# Patient Record
Sex: FEMALE | Race: OTHER | NOT HISPANIC OR LATINO | URBAN - METROPOLITAN AREA
[De-identification: names, ages, dates, MRNs, and addresses within clinical notes are randomized per-mention and may not be internally consistent; named-entity substitution may affect disease eponyms.]

---

## 2017-02-13 ENCOUNTER — EMERGENCY (EMERGENCY)
Facility: HOSPITAL | Age: 32
LOS: 1 days | Discharge: HOME | End: 2017-02-13
Attending: EMERGENCY MEDICINE | Admitting: EMERGENCY MEDICINE
Payer: COMMERCIAL

## 2017-02-13 VITALS — DIASTOLIC BLOOD PRESSURE: 74 MMHG | SYSTOLIC BLOOD PRESSURE: 118 MMHG | HEART RATE: 86 BPM | RESPIRATION RATE: 16 BRPM

## 2017-02-13 VITALS
OXYGEN SATURATION: 99 % | SYSTOLIC BLOOD PRESSURE: 102 MMHG | HEART RATE: 68 BPM | DIASTOLIC BLOOD PRESSURE: 63 MMHG | RESPIRATION RATE: 16 BRPM

## 2017-02-13 DIAGNOSIS — M54.5 LOW BACK PAIN: ICD-10-CM

## 2017-02-13 PROCEDURE — 99284 EMERGENCY DEPT VISIT MOD MDM: CPT

## 2017-02-13 PROCEDURE — 72170 X-RAY EXAM OF PELVIS: CPT | Mod: 26

## 2017-02-13 PROCEDURE — 72220 X-RAY EXAM SACRUM TAILBONE: CPT | Mod: 26

## 2017-02-13 PROCEDURE — 72170 X-RAY EXAM OF PELVIS: CPT

## 2017-02-13 PROCEDURE — 72220 X-RAY EXAM SACRUM TAILBONE: CPT

## 2017-02-13 RX ORDER — IBUPROFEN 200 MG
600 TABLET ORAL ONCE
Qty: 0 | Refills: 0 | Status: COMPLETED | OUTPATIENT
Start: 2017-02-13 | End: 2017-02-13

## 2017-02-13 RX ORDER — DIAZEPAM 5 MG
5 TABLET ORAL ONCE
Qty: 0 | Refills: 0 | Status: DISCONTINUED | OUTPATIENT
Start: 2017-02-13 | End: 2017-02-13

## 2017-02-13 RX ADMIN — Medication 600 MILLIGRAM(S): at 10:20

## 2017-02-13 RX ADMIN — Medication 5 MILLIGRAM(S): at 09:48

## 2017-02-13 RX ADMIN — Medication 600 MILLIGRAM(S): at 09:48

## 2017-02-13 NOTE — ED PROVIDER NOTE - MEDICAL DECISION MAKING DETAILS
salima -s/p mechanical fall w lower back pain, neuro intact - ttp midline sacrum -- coocyx xray - pelvic xray r/o ramus fx - analgesia and muscle relaxant and reeval

## 2017-02-13 NOTE — ED PROVIDER NOTE - PHYSICAL EXAMINATION
aao3 nad gcs 15 neck cleared cliniacvlly ctabl abd soft nt, no midline lttp ttp lower sacrum/coccyx pelvis stable from hip bl nocvat no saddle anesthesia 5/5 uele bl no shortening or rotation bl le -

## 2017-02-13 NOTE — ED PROVIDER NOTE - OBJECTIVE STATEMENT
31 f with asthma hashimotos presents with slip and fall backwards on ice no loc - co lower back pain, no weakness and numbness - pain 7/10 described as throbbing - able to ambulate with assistance - lmp 3 weeks ago no bowel or bladder complaints - ho bruised /broken taibone feels similar -

## 2017-02-13 NOTE — ED ADULT NURSE NOTE - OBJECTIVE STATEMENT
0935 31 yr old female brought to Er via ambulance on stretcher for further eval and tx of back pain. s/p slipped and fell on the ice landing on back. No LOC. MAEx4. no numbness or neck pain. A&Ox3

## 2017-02-13 NOTE — ED PROVIDER NOTE - CARE PLAN
Principal Discharge DX:	Contusion of coccyx, initial encounter Principal Discharge DX:	Contusion of coccyx, initial encounter  Instructions for follow-up, activity and diet:	1. Follow with your PMD within 48-72 hours.    2. Rest, no heavy lifting.  Warm compresses to area. Recommend Ortho consult to discuss possible MRI vs Physical Therapy- referral list provided.  Light walking. Take Motrin 600 mg every 8 hours for pain with food, .   3. Any worsening pain, weakness, numbness, bowel or urinary incontinence return to ER

## 2017-02-13 NOTE — ED PROVIDER NOTE - PLAN OF CARE
1. Follow with your PMD within 48-72 hours.    2. Rest, no heavy lifting.  Warm compresses to area. Recommend Ortho consult to discuss possible MRI vs Physical Therapy- referral list provided.  Light walking. Take Motrin 600 mg every 8 hours for pain with food, .   3. Any worsening pain, weakness, numbness, bowel or urinary incontinence return to ER

## 2017-12-08 ENCOUNTER — APPOINTMENT (OUTPATIENT)
Dept: BREAST CENTER | Facility: CLINIC | Age: 32
End: 2017-12-08
Payer: COMMERCIAL

## 2017-12-08 VITALS
RESPIRATION RATE: 14 BRPM | BODY MASS INDEX: 25.92 KG/M2 | TEMPERATURE: 97.8 F | HEIGHT: 69 IN | HEART RATE: 76 BPM | SYSTOLIC BLOOD PRESSURE: 96 MMHG | DIASTOLIC BLOOD PRESSURE: 63 MMHG | WEIGHT: 175 LBS

## 2017-12-08 PROCEDURE — 99213 OFFICE O/P EST LOW 20 MIN: CPT

## 2018-03-04 ENCOUNTER — FORM ENCOUNTER (OUTPATIENT)
Age: 33
End: 2018-03-04

## 2018-03-05 ENCOUNTER — APPOINTMENT (OUTPATIENT)
Dept: MAMMOGRAPHY | Facility: HOSPITAL | Age: 33
End: 2018-03-05

## 2018-03-05 ENCOUNTER — OUTPATIENT (OUTPATIENT)
Dept: OUTPATIENT SERVICES | Facility: HOSPITAL | Age: 33
LOS: 1 days | End: 2018-03-05
Payer: COMMERCIAL

## 2018-03-05 PROCEDURE — G0279: CPT | Mod: 26

## 2018-03-05 PROCEDURE — 77066 DX MAMMO INCL CAD BI: CPT | Mod: 26

## 2018-03-05 PROCEDURE — 77066 DX MAMMO INCL CAD BI: CPT

## 2018-03-05 PROCEDURE — G0279: CPT

## 2018-04-25 ENCOUNTER — APPOINTMENT (OUTPATIENT)
Dept: BREAST CENTER | Facility: CLINIC | Age: 33
End: 2018-04-25
Payer: COMMERCIAL

## 2018-04-25 ENCOUNTER — OUTPATIENT (OUTPATIENT)
Dept: OUTPATIENT SERVICES | Facility: HOSPITAL | Age: 33
LOS: 1 days | End: 2018-04-25

## 2018-04-25 ENCOUNTER — APPOINTMENT (OUTPATIENT)
Dept: MRI IMAGING | Facility: HOSPITAL | Age: 33
End: 2018-04-25

## 2018-04-25 DIAGNOSIS — D05.00 LOBULAR CARCINOMA IN SITU OF UNSPECIFIED BREAST: ICD-10-CM

## 2018-04-25 DIAGNOSIS — N64.52 NIPPLE DISCHARGE: ICD-10-CM

## 2018-04-25 DIAGNOSIS — D48.60 NEOPLASM OF UNCERTAIN BEHAVIOR OF UNSPECIFIED BREAST: ICD-10-CM

## 2018-04-25 DIAGNOSIS — N60.99 UNSPECIFIED BENIGN MAMMARY DYSPLASIA OF UNSPECIFIED BREAST: ICD-10-CM

## 2018-04-25 DIAGNOSIS — N63.20 UNSPECIFIED LUMP IN THE LEFT BREAST, UNSPECIFIED QUADRANT: ICD-10-CM

## 2018-04-25 PROCEDURE — 99214 OFFICE O/P EST MOD 30 MIN: CPT

## 2018-06-29 ENCOUNTER — CHART COPY (OUTPATIENT)
Age: 33
End: 2018-06-29

## 2018-07-02 ENCOUNTER — APPOINTMENT (OUTPATIENT)
Dept: MAMMOGRAPHY | Facility: HOSPITAL | Age: 33
End: 2018-07-02

## 2018-07-05 DIAGNOSIS — Z01.818 ENCOUNTER FOR OTHER PREPROCEDURAL EXAMINATION: ICD-10-CM

## 2018-07-09 ENCOUNTER — APPOINTMENT (OUTPATIENT)
Dept: ULTRASOUND IMAGING | Facility: HOSPITAL | Age: 33
End: 2018-07-09

## 2018-07-09 ENCOUNTER — APPOINTMENT (OUTPATIENT)
Dept: MAMMOGRAPHY | Facility: HOSPITAL | Age: 33
End: 2018-07-09

## 2018-07-09 ENCOUNTER — OUTPATIENT (OUTPATIENT)
Dept: OUTPATIENT SERVICES | Facility: HOSPITAL | Age: 33
LOS: 1 days | End: 2018-07-09
Payer: COMMERCIAL

## 2018-07-09 DIAGNOSIS — Z01.818 ENCOUNTER FOR OTHER PREPROCEDURAL EXAMINATION: ICD-10-CM

## 2018-07-09 LAB
ALBUMIN SERPL ELPH-MCNC: 4.1 G/DL — SIGNIFICANT CHANGE UP (ref 3.3–5)
ALP SERPL-CCNC: 36 U/L — LOW (ref 40–120)
ALT FLD-CCNC: 19 U/L — SIGNIFICANT CHANGE UP (ref 10–45)
ANION GAP SERPL CALC-SCNC: 12 MMOL/L — SIGNIFICANT CHANGE UP (ref 5–17)
AST SERPL-CCNC: 17 U/L — SIGNIFICANT CHANGE UP (ref 10–40)
BASOPHILS NFR BLD AUTO: 0.1 % — SIGNIFICANT CHANGE UP (ref 0–2)
BILIRUB SERPL-MCNC: 0.3 MG/DL — SIGNIFICANT CHANGE UP (ref 0.2–1.2)
BUN SERPL-MCNC: 4 MG/DL — LOW (ref 7–23)
CALCIUM SERPL-MCNC: 9.3 MG/DL — SIGNIFICANT CHANGE UP (ref 8.4–10.5)
CHLORIDE SERPL-SCNC: 102 MMOL/L — SIGNIFICANT CHANGE UP (ref 96–108)
CO2 SERPL-SCNC: 24 MMOL/L — SIGNIFICANT CHANGE UP (ref 22–31)
CREAT SERPL-MCNC: 0.43 MG/DL — LOW (ref 0.5–1.3)
EOSINOPHIL NFR BLD AUTO: 2.2 % — SIGNIFICANT CHANGE UP (ref 0–6)
GLUCOSE SERPL-MCNC: 98 MG/DL — SIGNIFICANT CHANGE UP (ref 70–99)
HCT VFR BLD CALC: 38 % — SIGNIFICANT CHANGE UP (ref 34.5–45)
HGB BLD-MCNC: 12.7 G/DL — SIGNIFICANT CHANGE UP (ref 11.5–15.5)
INR BLD: 1.09 — SIGNIFICANT CHANGE UP (ref 0.88–1.16)
LYMPHOCYTES # BLD AUTO: 18.6 % — SIGNIFICANT CHANGE UP (ref 13–44)
MCHC RBC-ENTMCNC: 30.7 PG — SIGNIFICANT CHANGE UP (ref 27–34)
MCHC RBC-ENTMCNC: 33.4 G/DL — SIGNIFICANT CHANGE UP (ref 32–36)
MCV RBC AUTO: 91.8 FL — SIGNIFICANT CHANGE UP (ref 80–100)
MONOCYTES NFR BLD AUTO: 4.7 % — SIGNIFICANT CHANGE UP (ref 2–14)
NEUTROPHILS NFR BLD AUTO: 74.4 % — SIGNIFICANT CHANGE UP (ref 43–77)
PLATELET # BLD AUTO: 209 K/UL — SIGNIFICANT CHANGE UP (ref 150–400)
POTASSIUM SERPL-MCNC: 4 MMOL/L — SIGNIFICANT CHANGE UP (ref 3.5–5.3)
POTASSIUM SERPL-SCNC: 4 MMOL/L — SIGNIFICANT CHANGE UP (ref 3.5–5.3)
PROT SERPL-MCNC: 6.3 G/DL — SIGNIFICANT CHANGE UP (ref 6–8.3)
PROTHROM AB SERPL-ACNC: 12.1 SEC — SIGNIFICANT CHANGE UP (ref 9.8–12.7)
RBC # BLD: 4.14 M/UL — SIGNIFICANT CHANGE UP (ref 3.8–5.2)
RBC # FLD: 12.9 % — SIGNIFICANT CHANGE UP (ref 10.3–16.9)
SODIUM SERPL-SCNC: 138 MMOL/L — SIGNIFICANT CHANGE UP (ref 135–145)
WBC # BLD: 7.9 K/UL — SIGNIFICANT CHANGE UP (ref 3.8–10.5)
WBC # FLD AUTO: 7.9 K/UL — SIGNIFICANT CHANGE UP (ref 3.8–10.5)

## 2018-07-09 PROCEDURE — 80053 COMPREHEN METABOLIC PANEL: CPT

## 2018-07-09 PROCEDURE — 93010 ELECTROCARDIOGRAM REPORT: CPT | Mod: NC

## 2018-07-09 PROCEDURE — 93005 ELECTROCARDIOGRAM TRACING: CPT

## 2018-07-09 PROCEDURE — 85610 PROTHROMBIN TIME: CPT

## 2018-07-09 PROCEDURE — 85025 COMPLETE CBC W/AUTO DIFF WBC: CPT

## 2018-07-16 ENCOUNTER — FORM ENCOUNTER (OUTPATIENT)
Age: 33
End: 2018-07-16

## 2018-07-16 PROBLEM — J45.20 MILD INTERMITTENT ASTHMA, UNCOMPLICATED: Chronic | Status: ACTIVE | Noted: 2017-02-13

## 2018-07-16 PROBLEM — E06.3 AUTOIMMUNE THYROIDITIS: Chronic | Status: ACTIVE | Noted: 2017-02-13

## 2018-07-16 NOTE — ASU PATIENT PROFILE, ADULT - PMH
Hashimoto's disease    Mild intermittent asthma without complication    Pregnancy  15 weeks pregnant

## 2018-07-17 ENCOUNTER — OUTPATIENT (OUTPATIENT)
Dept: OUTPATIENT SERVICES | Facility: HOSPITAL | Age: 33
LOS: 1 days | Discharge: HOME | End: 2018-07-17
Payer: COMMERCIAL

## 2018-07-17 ENCOUNTER — APPOINTMENT (OUTPATIENT)
Dept: MAMMOGRAPHY | Facility: HOSPITAL | Age: 33
End: 2018-07-17

## 2018-07-17 ENCOUNTER — APPOINTMENT (OUTPATIENT)
Dept: ULTRASOUND IMAGING | Facility: HOSPITAL | Age: 33
End: 2018-07-17

## 2018-07-17 ENCOUNTER — RESULT REVIEW (OUTPATIENT)
Age: 33
End: 2018-07-17

## 2018-07-17 VITALS
RESPIRATION RATE: 18 BRPM | SYSTOLIC BLOOD PRESSURE: 98 MMHG | OXYGEN SATURATION: 98 % | DIASTOLIC BLOOD PRESSURE: 58 MMHG | HEART RATE: 76 BPM

## 2018-07-17 VITALS
HEART RATE: 76 BPM | HEIGHT: 69 IN | TEMPERATURE: 98 F | OXYGEN SATURATION: 100 % | WEIGHT: 179.9 LBS | DIASTOLIC BLOOD PRESSURE: 54 MMHG | RESPIRATION RATE: 18 BRPM | SYSTOLIC BLOOD PRESSURE: 101 MMHG

## 2018-07-17 DIAGNOSIS — Z90.49 ACQUIRED ABSENCE OF OTHER SPECIFIED PARTS OF DIGESTIVE TRACT: Chronic | ICD-10-CM

## 2018-07-17 DIAGNOSIS — N60.09 SOLITARY CYST OF UNSPECIFIED BREAST: Chronic | ICD-10-CM

## 2018-07-17 LAB
GRAM STN FLD: SIGNIFICANT CHANGE UP
SPECIMEN SOURCE: SIGNIFICANT CHANGE UP

## 2018-07-17 PROCEDURE — 19120 REMOVAL OF BREAST LESION: CPT | Mod: RT

## 2018-07-17 PROCEDURE — 19126 EXCISION ADDL BREAST LESION: CPT | Mod: XS,LT

## 2018-07-17 PROCEDURE — 87015 SPECIMEN INFECT AGNT CONCNTJ: CPT

## 2018-07-17 PROCEDURE — 76098 X-RAY EXAM SURGICAL SPECIMEN: CPT

## 2018-07-17 PROCEDURE — 87070 CULTURE OTHR SPECIMN AEROBIC: CPT

## 2018-07-17 PROCEDURE — 19281 PERQ DEVICE BREAST 1ST IMAG: CPT | Mod: 59,LT

## 2018-07-17 PROCEDURE — 19282 PERQ DEVICE BREAST EA IMAG: CPT

## 2018-07-17 PROCEDURE — 88305 TISSUE EXAM BY PATHOLOGIST: CPT

## 2018-07-17 PROCEDURE — C1819: CPT

## 2018-07-17 PROCEDURE — 87102 FUNGUS ISOLATION CULTURE: CPT

## 2018-07-17 PROCEDURE — 19281 PERQ DEVICE BREAST 1ST IMAG: CPT

## 2018-07-17 PROCEDURE — 19285 PERQ DEV BREAST 1ST US IMAG: CPT | Mod: 59,LT

## 2018-07-17 PROCEDURE — 87206 SMEAR FLUORESCENT/ACID STAI: CPT

## 2018-07-17 PROCEDURE — 88307 TISSUE EXAM BY PATHOLOGIST: CPT

## 2018-07-17 PROCEDURE — 19366: CPT | Mod: RT

## 2018-07-17 PROCEDURE — 19125 EXCISION BREAST LESION: CPT | Mod: LT

## 2018-07-17 PROCEDURE — 19282 PERQ DEVICE BREAST EA IMAG: CPT | Mod: LT

## 2018-07-17 PROCEDURE — 76098 X-RAY EXAM SURGICAL SPECIMEN: CPT | Mod: 26

## 2018-07-17 PROCEDURE — 87205 SMEAR GRAM STAIN: CPT

## 2018-07-17 PROCEDURE — 87075 CULTR BACTERIA EXCEPT BLOOD: CPT

## 2018-07-17 PROCEDURE — 89051 BODY FLUID CELL COUNT: CPT

## 2018-07-17 PROCEDURE — 87116 MYCOBACTERIA CULTURE: CPT

## 2018-07-17 PROCEDURE — 19125 EXCISION BREAST LESION: CPT | Mod: GC

## 2018-07-17 PROCEDURE — 19285 PERQ DEV BREAST 1ST US IMAG: CPT

## 2018-07-17 RX ORDER — DIPHENHYDRAMINE HCL 50 MG
25 CAPSULE ORAL ONCE
Qty: 0 | Refills: 0 | Status: DISCONTINUED | OUTPATIENT
Start: 2018-07-17 | End: 2018-07-17

## 2018-07-17 RX ORDER — CEPHALEXIN 500 MG
500 CAPSULE ORAL
Qty: 10500 | Refills: 0 | OUTPATIENT
Start: 2018-07-17 | End: 2018-07-23

## 2018-07-17 RX ORDER — ACETAMINOPHEN 500 MG
650 TABLET ORAL EVERY 6 HOURS
Qty: 0 | Refills: 0 | Status: DISCONTINUED | OUTPATIENT
Start: 2018-07-17 | End: 2018-07-17

## 2018-07-17 RX ORDER — MORPHINE SULFATE 50 MG/1
1 CAPSULE, EXTENDED RELEASE ORAL
Qty: 12 | Refills: 0 | OUTPATIENT
Start: 2018-07-17 | End: 2018-07-19

## 2018-07-17 RX ORDER — DIPHENHYDRAMINE HCL 50 MG
25 CAPSULE ORAL ONCE
Qty: 0 | Refills: 0 | Status: COMPLETED | OUTPATIENT
Start: 2018-07-17 | End: 2018-07-17

## 2018-07-17 RX ADMIN — Medication 650 MILLIGRAM(S): at 17:25

## 2018-07-17 RX ADMIN — Medication 25 MILLIGRAM(S): at 17:22

## 2018-07-17 NOTE — PROGRESS NOTE ADULT - SUBJECTIVE AND OBJECTIVE BOX
Event note:    Patient examined at bedside for itching on face, upper chest, arms. Denies SOB or CP. Vitals reassuring. Also had one episode of nausea/emesis but resolved post-emesis.      Vital Signs Last 24 Hrs  T(C): 36.4 (17 Jul 2018 13:26), Max: 36.4 (17 Jul 2018 13:26)  T(F): 97.5 (17 Jul 2018 13:26), Max: 97.5 (17 Jul 2018 13:26)  HR: 72 (17 Jul 2018 16:37) (72 - 82)  BP: 105/58 (17 Jul 2018 16:37) (99/53 - 105/58)  BP(mean): 76 (17 Jul 2018 16:37) (73 - 77)  RR: 16 (17 Jul 2018 16:37) (15 - 23)  SpO2: 98% (17 Jul 2018 16:37) (97% - 100%)    I&O's Detail      Physical Exam:    No acute distress  Skin without hives. Some minor redness on R arm where patient says she was scratching.

## 2018-07-17 NOTE — PROGRESS NOTE ADULT - SUBJECTIVE AND OBJECTIVE BOX
Patient evaluated in OR postoperatively for fetal heart check.  .  Counseled patient on precautions to return to hospital, if vaginal bleeding, pelvic or abdominal pain, loss of fluid.

## 2018-07-17 NOTE — BRIEF OPERATIVE NOTE - SPECIMENS
R breast fluid sent for culture, R breast cystic mass, L sub areolar biopsy, L breast mass, L breast posterior calficiations

## 2018-07-17 NOTE — PROGRESS NOTE ADULT - ASSESSMENT
33 yo F s/p bilateral lumpectomies w/ post-op itching. Exam and vitals reassuring. Will give benadryl.

## 2018-07-17 NOTE — BRIEF OPERATIVE NOTE - OPERATION/FINDINGS
R breast w sub-areolar cystic mass w/ white-yellow fluid (pus vs mucin), L breast w/ calfcifications

## 2018-07-17 NOTE — BRIEF OPERATIVE NOTE - PROCEDURE
<<-----Click on this checkbox to enter Procedure Lumpectomy of both breasts after needle localization  07/17/2018    Active  PBORGER

## 2018-07-18 LAB
B PERT IGG+IGM PNL SER: SIGNIFICANT CHANGE UP
COLOR FLD: SIGNIFICANT CHANGE UP
FLUID INTAKE SUBSTANCE CLASS: SIGNIFICANT CHANGE UP
FLUID SEGMENTED GRANULOCYTES: 44 % — SIGNIFICANT CHANGE UP
LYMPHOCYTES # FLD: 4 % — SIGNIFICANT CHANGE UP
MONOS+MACROS # FLD: 52 % — SIGNIFICANT CHANGE UP
NIGHT BLUE STAIN TISS: SIGNIFICANT CHANGE UP
RCV VOL RI: HIGH /UL (ref 0–5)
SPECIMEN SOURCE FLD: SIGNIFICANT CHANGE UP
SPECIMEN SOURCE: SIGNIFICANT CHANGE UP
TOTAL NUCLEATED CELL COUNT, BODY FLUID: HIGH /UL (ref 0–5)
TUBE TYPE: SIGNIFICANT CHANGE UP

## 2018-07-19 LAB — CULTURE RESULTS: NO GROWTH — SIGNIFICANT CHANGE UP

## 2018-07-27 ENCOUNTER — APPOINTMENT (OUTPATIENT)
Dept: BREAST CENTER | Facility: CLINIC | Age: 33
End: 2018-07-27
Payer: COMMERCIAL

## 2018-07-27 DIAGNOSIS — N63.0 UNSPECIFIED LUMP IN UNSPECIFIED BREAST: ICD-10-CM

## 2018-07-27 DIAGNOSIS — N63.10 UNSPECIFIED LUMP IN THE RIGHT BREAST, UNSPECIFIED QUADRANT: ICD-10-CM

## 2018-07-27 LAB — SURGICAL PATHOLOGY STUDY: SIGNIFICANT CHANGE UP

## 2018-07-27 PROCEDURE — 99024 POSTOP FOLLOW-UP VISIT: CPT

## 2018-07-31 LAB — SURGICAL PATHOLOGY STUDY: SIGNIFICANT CHANGE UP

## 2018-08-15 LAB
CULTURE RESULTS: SIGNIFICANT CHANGE UP
SPECIMEN SOURCE: SIGNIFICANT CHANGE UP

## 2018-09-05 LAB
CULTURE RESULTS: SIGNIFICANT CHANGE UP
SPECIMEN SOURCE: SIGNIFICANT CHANGE UP

## 2019-01-01 ENCOUNTER — INPATIENT (INPATIENT)
Facility: HOSPITAL | Age: 34
LOS: 2 days | Discharge: HOME | End: 2019-01-04
Attending: OBSTETRICS & GYNECOLOGY | Admitting: OBSTETRICS & GYNECOLOGY
Payer: COMMERCIAL

## 2019-01-01 VITALS — HEIGHT: 69 IN | WEIGHT: 218.04 LBS

## 2019-01-01 DIAGNOSIS — O26.899 OTHER SPECIFIED PREGNANCY RELATED CONDITIONS, UNSPECIFIED TRIMESTER: ICD-10-CM

## 2019-01-01 DIAGNOSIS — Z90.49 ACQUIRED ABSENCE OF OTHER SPECIFIED PARTS OF DIGESTIVE TRACT: Chronic | ICD-10-CM

## 2019-01-01 DIAGNOSIS — Z3A.00 WEEKS OF GESTATION OF PREGNANCY NOT SPECIFIED: ICD-10-CM

## 2019-01-01 DIAGNOSIS — N60.09 SOLITARY CYST OF UNSPECIFIED BREAST: Chronic | ICD-10-CM

## 2019-01-01 LAB
BASOPHILS NFR BLD AUTO: 0.1 % — SIGNIFICANT CHANGE UP (ref 0–2)
BLD GP AB SCN SERPL QL: NEGATIVE — SIGNIFICANT CHANGE UP
EOSINOPHIL NFR BLD AUTO: 1.6 % — SIGNIFICANT CHANGE UP (ref 0–6)
HCT VFR BLD CALC: 37 % — SIGNIFICANT CHANGE UP (ref 34.5–45)
HGB BLD-MCNC: 12.7 G/DL — SIGNIFICANT CHANGE UP (ref 11.5–15.5)
LYMPHOCYTES # BLD AUTO: 22.2 % — SIGNIFICANT CHANGE UP (ref 13–44)
MCHC RBC-ENTMCNC: 32.6 PG — SIGNIFICANT CHANGE UP (ref 27–34)
MCHC RBC-ENTMCNC: 34.3 G/DL — SIGNIFICANT CHANGE UP (ref 32–36)
MCV RBC AUTO: 95.1 FL — SIGNIFICANT CHANGE UP (ref 80–100)
MONOCYTES NFR BLD AUTO: 6.9 % — SIGNIFICANT CHANGE UP (ref 2–14)
NEUTROPHILS NFR BLD AUTO: 69.2 % — SIGNIFICANT CHANGE UP (ref 43–77)
PLATELET # BLD AUTO: 196 K/UL — SIGNIFICANT CHANGE UP (ref 150–400)
RBC # BLD: 3.89 M/UL — SIGNIFICANT CHANGE UP (ref 3.8–5.2)
RBC # FLD: 13.2 % — SIGNIFICANT CHANGE UP (ref 10.3–16.9)
RH IG SCN BLD-IMP: POSITIVE — SIGNIFICANT CHANGE UP
RH IG SCN BLD-IMP: POSITIVE — SIGNIFICANT CHANGE UP
WBC # BLD: 7.5 K/UL — SIGNIFICANT CHANGE UP (ref 3.8–10.5)
WBC # FLD AUTO: 7.5 K/UL — SIGNIFICANT CHANGE UP (ref 3.8–10.5)

## 2019-01-01 RX ORDER — SODIUM CHLORIDE 9 MG/ML
1000 INJECTION, SOLUTION INTRAVENOUS ONCE
Qty: 0 | Refills: 0 | Status: DISCONTINUED | OUTPATIENT
Start: 2019-01-01 | End: 2019-01-02

## 2019-01-01 RX ORDER — CITRIC ACID/SODIUM CITRATE 300-500 MG
15 SOLUTION, ORAL ORAL EVERY 4 HOURS
Qty: 0 | Refills: 0 | Status: DISCONTINUED | OUTPATIENT
Start: 2019-01-01 | End: 2019-01-02

## 2019-01-01 RX ORDER — SODIUM CHLORIDE 9 MG/ML
1000 INJECTION, SOLUTION INTRAVENOUS
Qty: 0 | Refills: 0 | Status: DISCONTINUED | OUTPATIENT
Start: 2019-01-01 | End: 2019-01-02

## 2019-01-01 RX ORDER — OXYTOCIN 10 UNIT/ML
2 VIAL (ML) INJECTION
Qty: 30 | Refills: 0 | Status: DISCONTINUED | OUTPATIENT
Start: 2019-01-01 | End: 2019-01-02

## 2019-01-01 RX ORDER — ALBUTEROL 90 UG/1
2 AEROSOL, METERED ORAL EVERY 6 HOURS
Qty: 0 | Refills: 0 | Status: DISCONTINUED | OUTPATIENT
Start: 2019-01-01 | End: 2019-01-04

## 2019-01-01 RX ORDER — OXYTOCIN 10 UNIT/ML
333.33 VIAL (ML) INJECTION
Qty: 20 | Refills: 0 | Status: DISCONTINUED | OUTPATIENT
Start: 2019-01-01 | End: 2019-01-02

## 2019-01-01 RX ORDER — INFLUENZA VIRUS VACCINE 15; 15; 15; 15 UG/.5ML; UG/.5ML; UG/.5ML; UG/.5ML
0.5 SUSPENSION INTRAMUSCULAR ONCE
Qty: 0 | Refills: 0 | Status: DISCONTINUED | OUTPATIENT
Start: 2019-01-01 | End: 2019-01-02

## 2019-01-01 RX ORDER — FENTANYL/BUPIVACAINE/NS/PF 2MCG/ML-.1
250 PLASTIC BAG, INJECTION (ML) INJECTION
Qty: 0 | Refills: 0 | Status: DISCONTINUED | OUTPATIENT
Start: 2019-01-01 | End: 2019-01-02

## 2019-01-01 RX ADMIN — Medication 2 MILLIUNIT(S)/MIN: at 14:43

## 2019-01-01 RX ADMIN — SODIUM CHLORIDE 125 MILLILITER(S): 9 INJECTION, SOLUTION INTRAVENOUS at 14:20

## 2019-01-01 RX ADMIN — SODIUM CHLORIDE 125 MILLILITER(S): 9 INJECTION, SOLUTION INTRAVENOUS at 18:27

## 2019-01-02 LAB — T PALLIDUM AB TITR SER: NEGATIVE — SIGNIFICANT CHANGE UP

## 2019-01-02 RX ORDER — SIMETHICONE 80 MG/1
80 TABLET, CHEWABLE ORAL EVERY 6 HOURS
Qty: 0 | Refills: 0 | Status: DISCONTINUED | OUTPATIENT
Start: 2019-01-02 | End: 2019-01-04

## 2019-01-02 RX ORDER — ACETAMINOPHEN 500 MG
650 TABLET ORAL EVERY 6 HOURS
Qty: 0 | Refills: 0 | Status: DISCONTINUED | OUTPATIENT
Start: 2019-01-02 | End: 2019-01-04

## 2019-01-02 RX ORDER — TETANUS TOXOID, REDUCED DIPHTHERIA TOXOID AND ACELLULAR PERTUSSIS VACCINE, ADSORBED 5; 2.5; 8; 8; 2.5 [IU]/.5ML; [IU]/.5ML; UG/.5ML; UG/.5ML; UG/.5ML
0.5 SUSPENSION INTRAMUSCULAR ONCE
Qty: 0 | Refills: 0 | Status: COMPLETED | OUTPATIENT
Start: 2019-01-02 | End: 2019-12-01

## 2019-01-02 RX ORDER — LANOLIN
1 OINTMENT (GRAM) TOPICAL EVERY 6 HOURS
Qty: 0 | Refills: 0 | Status: DISCONTINUED | OUTPATIENT
Start: 2019-01-02 | End: 2019-01-04

## 2019-01-02 RX ORDER — LEVOTHYROXINE SODIUM 125 MCG
150 TABLET ORAL DAILY
Qty: 0 | Refills: 0 | Status: DISCONTINUED | OUTPATIENT
Start: 2019-01-02 | End: 2019-01-04

## 2019-01-02 RX ORDER — PRAMOXINE HYDROCHLORIDE 150 MG/15G
1 AEROSOL, FOAM RECTAL EVERY 4 HOURS
Qty: 0 | Refills: 0 | Status: DISCONTINUED | OUTPATIENT
Start: 2019-01-02 | End: 2019-01-04

## 2019-01-02 RX ORDER — AER TRAVELER 0.5 G/1
1 SOLUTION RECTAL; TOPICAL EVERY 4 HOURS
Qty: 0 | Refills: 0 | Status: DISCONTINUED | OUTPATIENT
Start: 2019-01-02 | End: 2019-01-04

## 2019-01-02 RX ORDER — MAGNESIUM HYDROXIDE 400 MG/1
30 TABLET, CHEWABLE ORAL
Qty: 0 | Refills: 0 | Status: DISCONTINUED | OUTPATIENT
Start: 2019-01-02 | End: 2019-01-04

## 2019-01-02 RX ORDER — SODIUM CHLORIDE 9 MG/ML
3 INJECTION INTRAMUSCULAR; INTRAVENOUS; SUBCUTANEOUS EVERY 8 HOURS
Qty: 0 | Refills: 0 | Status: DISCONTINUED | OUTPATIENT
Start: 2019-01-02 | End: 2019-01-04

## 2019-01-02 RX ORDER — DIBUCAINE 1 %
1 OINTMENT (GRAM) RECTAL EVERY 4 HOURS
Qty: 0 | Refills: 0 | Status: DISCONTINUED | OUTPATIENT
Start: 2019-01-02 | End: 2019-01-04

## 2019-01-02 RX ORDER — INFLUENZA VIRUS VACCINE 15; 15; 15; 15 UG/.5ML; UG/.5ML; UG/.5ML; UG/.5ML
0.5 SUSPENSION INTRAMUSCULAR ONCE
Qty: 0 | Refills: 0 | Status: COMPLETED | OUTPATIENT
Start: 2019-01-02 | End: 2019-01-02

## 2019-01-02 RX ORDER — DIPHENHYDRAMINE HCL 50 MG
25 CAPSULE ORAL EVERY 6 HOURS
Qty: 0 | Refills: 0 | Status: DISCONTINUED | OUTPATIENT
Start: 2019-01-02 | End: 2019-01-04

## 2019-01-02 RX ORDER — HYDROCORTISONE 1 %
1 OINTMENT (GRAM) TOPICAL EVERY 4 HOURS
Qty: 0 | Refills: 0 | Status: DISCONTINUED | OUTPATIENT
Start: 2019-01-02 | End: 2019-01-04

## 2019-01-02 RX ORDER — GLYCERIN ADULT
1 SUPPOSITORY, RECTAL RECTAL AT BEDTIME
Qty: 0 | Refills: 0 | Status: DISCONTINUED | OUTPATIENT
Start: 2019-01-02 | End: 2019-01-04

## 2019-01-02 RX ORDER — BENZOCAINE 10 %
1 GEL (GRAM) MUCOUS MEMBRANE EVERY 6 HOURS
Qty: 0 | Refills: 0 | Status: DISCONTINUED | OUTPATIENT
Start: 2019-01-02 | End: 2019-01-04

## 2019-01-02 RX ORDER — DOCUSATE SODIUM 100 MG
100 CAPSULE ORAL
Qty: 0 | Refills: 0 | Status: DISCONTINUED | OUTPATIENT
Start: 2019-01-02 | End: 2019-01-04

## 2019-01-02 RX ORDER — OXYTOCIN 10 UNIT/ML
41.67 VIAL (ML) INJECTION
Qty: 20 | Refills: 0 | Status: DISCONTINUED | OUTPATIENT
Start: 2019-01-02 | End: 2019-01-04

## 2019-01-02 RX ORDER — IBUPROFEN 200 MG
600 TABLET ORAL EVERY 6 HOURS
Qty: 0 | Refills: 0 | Status: DISCONTINUED | OUTPATIENT
Start: 2019-01-02 | End: 2019-01-04

## 2019-01-02 RX ADMIN — Medication 600 MILLIGRAM(S): at 18:38

## 2019-01-02 RX ADMIN — Medication 600 MILLIGRAM(S): at 19:33

## 2019-01-02 RX ADMIN — Medication 600 MILLIGRAM(S): at 23:46

## 2019-01-02 RX ADMIN — SIMETHICONE 80 MILLIGRAM(S): 80 TABLET, CHEWABLE ORAL at 23:46

## 2019-01-02 RX ADMIN — Medication 1 APPLICATION(S): at 12:30

## 2019-01-02 RX ADMIN — AER TRAVELER 1 APPLICATION(S): 0.5 SOLUTION RECTAL; TOPICAL at 22:10

## 2019-01-02 RX ADMIN — AER TRAVELER 1 APPLICATION(S): 0.5 SOLUTION RECTAL; TOPICAL at 12:28

## 2019-01-02 RX ADMIN — AER TRAVELER 1 APPLICATION(S): 0.5 SOLUTION RECTAL; TOPICAL at 18:38

## 2019-01-02 RX ADMIN — Medication 600 MILLIGRAM(S): at 13:41

## 2019-01-02 RX ADMIN — Medication 600 MILLIGRAM(S): at 12:27

## 2019-01-02 RX ADMIN — Medication 1 SPRAY(S): at 12:29

## 2019-01-02 RX ADMIN — Medication 100 MILLIGRAM(S): at 23:46

## 2019-01-02 RX ADMIN — Medication 100 MILLIGRAM(S): at 12:51

## 2019-01-02 RX ADMIN — Medication 1 TABLET(S): at 12:28

## 2019-01-02 NOTE — LACTATION INITIAL EVALUATION - NS LACT CON REASON FOR REQ
primaparous mom/Baby ~14 hrs old, voiding and stooling, sleeping at this time swaddled with family member. Mother reports baby fed after delivery and has either been sleepy or becomes fussy when unswaddled. Normal  behavior and breastfeeding basics explained. Assisted in placing baby in SSC and encouraged latch on. Baby suckles and fusses before falling back asleep. Mother has h/o multiple cysts removed from breasts bilaterally, with periareolar scarring on right breast. Demonstrated and encouraged hand expression to stimulate milk supply and to request pump if baby is not latching consistently by 24 hours of life. Due to h/o hashimoto’s, pt encouraged to have thyroid levels checked regularly as low thyroid can cause reduction in milk supply. Encouraged continued SSC and rooming-in, and for the parents to call for further assistance as needed.

## 2019-01-03 ENCOUNTER — TRANSCRIPTION ENCOUNTER (OUTPATIENT)
Age: 34
End: 2019-01-03

## 2019-01-03 RX ORDER — THYROID 120 MG
150 TABLET ORAL
Qty: 0 | Refills: 0 | COMMUNITY

## 2019-01-03 RX ADMIN — AER TRAVELER 1 APPLICATION(S): 0.5 SOLUTION RECTAL; TOPICAL at 10:00

## 2019-01-03 RX ADMIN — Medication 150 MICROGRAM(S): at 06:10

## 2019-01-03 RX ADMIN — Medication 600 MILLIGRAM(S): at 06:10

## 2019-01-03 RX ADMIN — AER TRAVELER 1 APPLICATION(S): 0.5 SOLUTION RECTAL; TOPICAL at 06:41

## 2019-01-03 RX ADMIN — Medication 1 TABLET(S): at 12:03

## 2019-01-03 RX ADMIN — AER TRAVELER 1 APPLICATION(S): 0.5 SOLUTION RECTAL; TOPICAL at 15:47

## 2019-01-03 RX ADMIN — Medication 600 MILLIGRAM(S): at 12:03

## 2019-01-03 RX ADMIN — SODIUM CHLORIDE 3 MILLILITER(S): 9 INJECTION INTRAMUSCULAR; INTRAVENOUS; SUBCUTANEOUS at 15:47

## 2019-01-03 RX ADMIN — Medication 600 MILLIGRAM(S): at 00:50

## 2019-01-03 RX ADMIN — AER TRAVELER 1 APPLICATION(S): 0.5 SOLUTION RECTAL; TOPICAL at 18:10

## 2019-01-03 RX ADMIN — Medication 600 MILLIGRAM(S): at 13:15

## 2019-01-03 RX ADMIN — SIMETHICONE 80 MILLIGRAM(S): 80 TABLET, CHEWABLE ORAL at 06:10

## 2019-01-03 RX ADMIN — Medication 600 MILLIGRAM(S): at 07:10

## 2019-01-03 RX ADMIN — AER TRAVELER 1 APPLICATION(S): 0.5 SOLUTION RECTAL; TOPICAL at 02:10

## 2019-01-03 NOTE — DISCHARGE NOTE OB - USE THE FOOD PYRAMID (LOCATED IN DISCHARGE MATERIALS PROVIDED) AS A GUIDE TO BALANCE AND MODERATION
normal (ped)... In no apparent distress, appears well developed and well nourished. Statement Selected

## 2019-01-03 NOTE — DISCHARGE NOTE OB - CARE PROVIDER_API CALL
Edgardo Moe (MD), Obstetrics and Gynecology  [address] [place] [address], [place] [id number]  Phone: [phone]  Fax: [phone]

## 2019-01-03 NOTE — DISCHARGE NOTE OB - MATERIALS PROVIDED
Bottle Feeding Log/[facility] Hearing Screen Program/Shaken Baby Prevention Handout/Tdap Vaccination (VIS Pub Date: 2012)/[facility]  Screening Program/[place]  Immunization Record/Breastfeeding Guide and Packet/Breastfeeding Log/Breastfeeding Mother’s Support Group Information/Guide to Postpartum Care/Back To Sleep Handout/Birth Certificate Instructions/Discharge Medication Information for Patients and Families Pocket Guide/Vaccinations

## 2019-01-03 NOTE — DISCHARGE NOTE OB - PATIENT PORTAL LINK FT
You can access the [facility] Patient Portal, offered by [facility], by registering with the following website: [email or web address]

## 2019-01-03 NOTE — PROGRESS NOTE ADULT - SUBJECTIVE AND OBJECTIVE BOX
Patient evaluated at bedside.   She reports pain is well controlled with motrin and tylenol. Patient reporting mild gas pain, encouraged taking simethacone and chewing gum    She has been ambulating without assistance, voiding spontaneously, and is breastfeeding.    She denies HA, dizziness, chest pain, palpitations, shortness of breathe, n/v, heavy vaginal bleeding or perineal discomfort.    Physical Exam:  Vital Signs Last 24 Hrs  T(C): 36.2 (03 Jan 2019 06:26), Max: 37 (02 Jan 2019 18:00)  T(F): 97.1 (03 Jan 2019 06:26), Max: 98.6 (02 Jan 2019 18:00)  HR: 80 (03 Jan 2019 06:26) (75 - 88)  BP: 97/57 (03 Jan 2019 06:26) (11/71 - 119/65)  BP(mean): --  RR: 18 (03 Jan 2019 06:26) (18 - 18)  SpO2: 95% (03 Jan 2019 06:26) (95% - 100%)    GA: NAD, A+0 x 3  Abd: + BS, soft, nontender, nondistended, no rebound or guarding, uterus firm at midline 3 fb below midline   : lochia WNL  Extremities: no swelling or calf tenderness, neg hommans sign                          12.7   7.5   )-----------( 196      ( 01 Jan 2019 15:10 )             37.0       MEDICATIONS  (STANDING):  diphtheria/tetanus/pertussis (acellular) Vaccine (ADAcel) 0.5 milliLiter(s) IntraMuscular once  ibuprofen  Tablet. 600 milliGRAM(s) Oral every 6 hours  influenza   Vaccine 0.5 milliLiter(s) IntraMuscular once  levothyroxine 150 MICROGram(s) Oral daily  oxytocin Infusion 41.667 milliUNIT(s)/Min (125 mL/Hr) IV Continuous <Continuous>  prenatal multivitamin 1 Tablet(s) Oral daily  sodium chloride 0.9% lock flush 3 milliLiter(s) IV Push every 8 hours  witch hazel Pads 1 Application(s) Topical every 4 hours    MEDICATIONS  (PRN):  acetaminophen   Tablet .. 650 milliGRAM(s) Oral every 6 hours PRN Temp greater or equal to 38.5C (101.3F), Mild Pain (1 - 3)  ALBUTerol    90 MICROgram(s) HFA Inhaler 2 Puff(s) Inhalation every 6 hours PRN Shortness of Breath and/or Wheezing  benzocaine 20%/menthol 0.5% Spray 1 Spray(s) Topical every 6 hours PRN Mild Perineal discomfort  dibucaine 1% Ointment 1 Application(s) Topical every 4 hours PRN Perineal Discomfort  diphenhydrAMINE 25 milliGRAM(s) Oral every 6 hours PRN Itching  docusate sodium 100 milliGRAM(s) Oral two times a day PRN Stool Softening  glycerin Suppository - Adult 1 Suppository(s) Rectal at bedtime PRN Constipation  hydrocortisone 1% Cream 1 Application(s) Topical every 4 hours PRN Moderate Perineal Pain  lanolin Ointment 1 Application(s) Topical every 6 hours PRN Sore Nipples  magnesium hydroxide Suspension 30 milliLiter(s) Oral two times a day PRN Constipation  pramoxine 1%/zinc 5% Cream 1 Application(s) Topical every 4 hours PRN Severe Perineal Pain  simethicone 80 milliGRAM(s) Chew every 6 hours PRN Gas

## 2019-01-04 VITALS
TEMPERATURE: 98 F | RESPIRATION RATE: 18 BRPM | OXYGEN SATURATION: 98 % | SYSTOLIC BLOOD PRESSURE: 95 MMHG | DIASTOLIC BLOOD PRESSURE: 54 MMHG | HEART RATE: 88 BPM

## 2019-01-04 PROCEDURE — 36415 COLL VENOUS BLD VENIPUNCTURE: CPT

## 2019-01-04 PROCEDURE — 99214 OFFICE O/P EST MOD 30 MIN: CPT

## 2019-01-04 PROCEDURE — 86780 TREPONEMA PALLIDUM: CPT

## 2019-01-04 PROCEDURE — 86901 BLOOD TYPING SEROLOGIC RH(D): CPT

## 2019-01-04 PROCEDURE — 86850 RBC ANTIBODY SCREEN: CPT

## 2019-01-04 PROCEDURE — 85025 COMPLETE CBC W/AUTO DIFF WBC: CPT

## 2019-01-04 PROCEDURE — 90715 TDAP VACCINE 7 YRS/> IM: CPT

## 2019-01-04 PROCEDURE — 86900 BLOOD TYPING SEROLOGIC ABO: CPT

## 2019-01-04 RX ORDER — TETANUS TOXOID, REDUCED DIPHTHERIA TOXOID AND ACELLULAR PERTUSSIS VACCINE, ADSORBED 5; 2.5; 8; 8; 2.5 [IU]/.5ML; [IU]/.5ML; UG/.5ML; UG/.5ML; UG/.5ML
0.5 SUSPENSION INTRAMUSCULAR ONCE
Qty: 0 | Refills: 0 | Status: COMPLETED | OUTPATIENT
Start: 2019-01-04 | End: 2019-01-04

## 2019-01-04 RX ADMIN — Medication 600 MILLIGRAM(S): at 13:25

## 2019-01-04 RX ADMIN — Medication 600 MILLIGRAM(S): at 00:11

## 2019-01-04 RX ADMIN — Medication 150 MICROGRAM(S): at 06:16

## 2019-01-04 RX ADMIN — Medication 600 MILLIGRAM(S): at 18:01

## 2019-01-04 RX ADMIN — Medication 25 MILLIGRAM(S): at 18:01

## 2019-01-04 RX ADMIN — Medication 100 MILLIGRAM(S): at 12:56

## 2019-01-04 RX ADMIN — TETANUS TOXOID, REDUCED DIPHTHERIA TOXOID AND ACELLULAR PERTUSSIS VACCINE, ADSORBED 0.5 MILLILITER(S): 5; 2.5; 8; 8; 2.5 SUSPENSION INTRAMUSCULAR at 12:57

## 2019-01-04 RX ADMIN — Medication 600 MILLIGRAM(S): at 06:16

## 2019-01-04 RX ADMIN — SIMETHICONE 80 MILLIGRAM(S): 80 TABLET, CHEWABLE ORAL at 06:16

## 2019-01-04 RX ADMIN — Medication 100 MILLIGRAM(S): at 00:10

## 2019-01-04 RX ADMIN — AER TRAVELER 1 APPLICATION(S): 0.5 SOLUTION RECTAL; TOPICAL at 04:16

## 2019-01-04 RX ADMIN — Medication 600 MILLIGRAM(S): at 01:00

## 2019-01-04 RX ADMIN — Medication 600 MILLIGRAM(S): at 18:26

## 2019-01-04 RX ADMIN — AER TRAVELER 1 APPLICATION(S): 0.5 SOLUTION RECTAL; TOPICAL at 20:27

## 2019-01-04 RX ADMIN — Medication 600 MILLIGRAM(S): at 12:56

## 2019-01-04 RX ADMIN — SIMETHICONE 80 MILLIGRAM(S): 80 TABLET, CHEWABLE ORAL at 00:10

## 2019-01-04 RX ADMIN — Medication 1 TABLET(S): at 12:56

## 2019-01-04 RX ADMIN — Medication 600 MILLIGRAM(S): at 07:13

## 2019-01-04 RX ADMIN — Medication 1 APPLICATION(S): at 12:56

## 2019-01-04 NOTE — PROGRESS NOTE ADULT - SUBJECTIVE AND OBJECTIVE BOX
Patient evaluated at bedside.   She reports pain is well controlled with motrin    She has been ambulating without assistance, voiding spontaneously, and is breastfeeding.    She denies HA, dizziness, chest pain, palpitations, shortness of breathe, n/v, heavy vaginal bleeding or perineal discomfort.    Physical Exam:  Vital Signs Last 24 Hrs  T(C): 36.4 (03 Jan 2019 18:00), Max: 37.1 (03 Jan 2019 10:14)  T(F): 97.6 (03 Jan 2019 18:00), Max: 98.8 (03 Jan 2019 10:14)  HR: 78 (03 Jan 2019 18:00) (78 - 93)  BP: 117/56 (03 Jan 2019 18:00) (117/56 - 117/71)  BP(mean): --  RR: 18 (03 Jan 2019 18:00) (18 - 18)  SpO2: 98% (03 Jan 2019 18:00) (98% - 98%)    GA: NAD, A+0 x 3  Abd: + BS, soft, nontender, nondistended, no rebound or guarding, uterus firm at midline 3 fb below umbilicus  : lochia WNL  Extremities: no swelling or calf tenderness        MEDICATIONS  (STANDING):  diphtheria/tetanus/pertussis (acellular) Vaccine (ADAcel) 0.5 milliLiter(s) IntraMuscular once  ibuprofen  Tablet. 600 milliGRAM(s) Oral every 6 hours  influenza   Vaccine 0.5 milliLiter(s) IntraMuscular once  levothyroxine 150 MICROGram(s) Oral daily  oxytocin Infusion 41.667 milliUNIT(s)/Min (125 mL/Hr) IV Continuous <Continuous>  prenatal multivitamin 1 Tablet(s) Oral daily  sodium chloride 0.9% lock flush 3 milliLiter(s) IV Push every 8 hours  witch hazel Pads 1 Application(s) Topical every 4 hours    MEDICATIONS  (PRN):  acetaminophen   Tablet .. 650 milliGRAM(s) Oral every 6 hours PRN Temp greater or equal to 38.5C (101.3F), Mild Pain (1 - 3)  ALBUTerol    90 MICROgram(s) HFA Inhaler 2 Puff(s) Inhalation every 6 hours PRN Shortness of Breath and/or Wheezing  benzocaine 20%/menthol 0.5% Spray 1 Spray(s) Topical every 6 hours PRN Mild Perineal discomfort  dibucaine 1% Ointment 1 Application(s) Topical every 4 hours PRN Perineal Discomfort  diphenhydrAMINE 25 milliGRAM(s) Oral every 6 hours PRN Itching  docusate sodium 100 milliGRAM(s) Oral two times a day PRN Stool Softening  glycerin Suppository - Adult 1 Suppository(s) Rectal at bedtime PRN Constipation  hydrocortisone 1% Cream 1 Application(s) Topical every 4 hours PRN Moderate Perineal Pain  lanolin Ointment 1 Application(s) Topical every 6 hours PRN Sore Nipples  magnesium hydroxide Suspension 30 milliLiter(s) Oral two times a day PRN Constipation  pramoxine 1%/zinc 5% Cream 1 Application(s) Topical every 4 hours PRN Severe Perineal Pain  simethicone 80 milliGRAM(s) Chew every 6 hours PRN Gas

## 2019-01-07 DIAGNOSIS — J45.909 UNSPECIFIED ASTHMA, UNCOMPLICATED: ICD-10-CM

## 2019-01-07 DIAGNOSIS — Z98.890 OTHER SPECIFIED POSTPROCEDURAL STATES: ICD-10-CM

## 2019-01-07 DIAGNOSIS — Z90.49 ACQUIRED ABSENCE OF OTHER SPECIFIED PARTS OF DIGESTIVE TRACT: ICD-10-CM

## 2019-01-07 DIAGNOSIS — Z34.03 ENCOUNTER FOR SUPERVISION OF NORMAL FIRST PREGNANCY, THIRD TRIMESTER: ICD-10-CM

## 2019-01-07 DIAGNOSIS — Z3A.39 39 WEEKS GESTATION OF PREGNANCY: ICD-10-CM

## 2019-01-07 DIAGNOSIS — Z88.5 ALLERGY STATUS TO NARCOTIC AGENT: ICD-10-CM

## 2019-01-07 DIAGNOSIS — Z23 ENCOUNTER FOR IMMUNIZATION: ICD-10-CM

## 2019-01-07 DIAGNOSIS — O42.92 FULL-TERM PREMATURE RUPTURE OF MEMBRANES, UNSPECIFIED AS TO LENGTH OF TIME BETWEEN RUPTURE AND ONSET OF LABOR: ICD-10-CM

## 2019-01-07 DIAGNOSIS — Z80.3 FAMILY HISTORY OF MALIGNANT NEOPLASM OF BREAST: ICD-10-CM

## 2019-01-07 DIAGNOSIS — E06.3 AUTOIMMUNE THYROIDITIS: ICD-10-CM

## 2020-03-02 ENCOUNTER — EMERGENCY (EMERGENCY)
Facility: HOSPITAL | Age: 35
LOS: 1 days | Discharge: HOME | End: 2020-03-02
Attending: EMERGENCY MEDICINE
Payer: COMMERCIAL

## 2020-03-02 VITALS
TEMPERATURE: 99 F | OXYGEN SATURATION: 99 % | SYSTOLIC BLOOD PRESSURE: 130 MMHG | DIASTOLIC BLOOD PRESSURE: 73 MMHG | RESPIRATION RATE: 18 BRPM | HEART RATE: 116 BPM

## 2020-03-02 DIAGNOSIS — N60.09 SOLITARY CYST OF UNSPECIFIED BREAST: Chronic | ICD-10-CM

## 2020-03-02 DIAGNOSIS — Z90.49 ACQUIRED ABSENCE OF OTHER SPECIFIED PARTS OF DIGESTIVE TRACT: Chronic | ICD-10-CM

## 2020-03-02 LAB
APTT BLD: 31.8 SEC — SIGNIFICANT CHANGE UP (ref 27.5–36.3)
CK SERPL-CCNC: 31 U/L — SIGNIFICANT CHANGE UP (ref 25–170)
INR BLD: 1.08 RATIO — SIGNIFICANT CHANGE UP (ref 0.88–1.16)
MAGNESIUM SERPL-MCNC: 1.9 MG/DL — SIGNIFICANT CHANGE UP (ref 1.6–2.6)
PHOSPHATE SERPL-MCNC: 4.9 MG/DL — HIGH (ref 2.5–4.5)
PROTHROM AB SERPL-ACNC: 12.5 SEC — SIGNIFICANT CHANGE UP (ref 10–12.9)
TROPONIN T, HIGH SENSITIVITY RESULT: 7 NG/L — SIGNIFICANT CHANGE UP (ref 0–51)

## 2020-03-02 PROCEDURE — 99285 EMERGENCY DEPT VISIT HI MDM: CPT

## 2020-03-02 PROCEDURE — 93010 ELECTROCARDIOGRAM REPORT: CPT

## 2020-03-02 NOTE — ED ADULT NURSE NOTE - OBJECTIVE STATEMENT
35y/o female walked into ED a&ox3 c/o racing heart. Patient reports for the past 2months she has been 35y/o female walked into ED a&ox3 c/o racing heart. Patient reports for the past 2months she has been having generalized muscle pain, racing heart and feeling hot. States symptoms come and go but have not stopped for the past 2 months. Denies HA, N/V/D, fever, SOB, chest pain, dizziness, urinary symptoms, recent travel. Lungs clear b/l. Abd soft, nontender, nondistended. YORK x4 equal in strength b/l. MD Sauer at bedside for eval.

## 2020-03-02 NOTE — ED PROVIDER NOTE - NS ED ROS FT
ROS:  GENERAL: +generalized weakness. No fever, no chills  EYES: no change in vision  HEENT: no trouble swallowing, no trouble speaking  CARDIAC: +palpitations. no chest pain  PULMONARY: +SOB. no cough   GI: no abdominal pain, no nausea, no vomiting, no diarrhea, no constipation  : No dysuria, no frequency, no change in appearance, or odor of urine  SKIN: no rashes  NEURO: no headache, no focal weakness  MSK: No joint pain    Tomasz Sauer PGY2

## 2020-03-02 NOTE — ED PROVIDER NOTE - CLINICAL SUMMARY MEDICAL DECISION MAKING FREE TEXT BOX
Generalized weakness, SOB, tachycardia, increased thirst. Pt well appearing, HD stable. Will assess for thyroid dysfunction, PE, PTX, PNA, pregnancy, electrolyte abnormality with labs, XR. Symptoms possibly 2/2 hyperthyroidism. Pt has endo f/u in 2 days.

## 2020-03-02 NOTE — ED ADULT NURSE NOTE - NSIMPLEMENTINTERV_GEN_ALL_ED
Implemented All Universal Safety Interventions:  [place] to call system. Call bell, personal items and telephone within reach. Instruct patient to call for assistance. Room bathroom lighting operational. Non-slip footwear when patient is off stretcher. Physically safe environment: no spills, clutter or unnecessary equipment. Stretcher in lowest position, wheels locked, appropriate side rails in place.

## 2020-03-02 NOTE — ED PROVIDER NOTE - PATIENT PORTAL LINK FT
You can access the FollowMyHealth Patient Portal offered by [facility] by registering at the following website: [email or web address]. By joining [employer]’s FollowMyHealth portal, you will also be able to view your health information using other applications (apps) compatible with our system.

## 2020-03-02 NOTE — ED PROVIDER NOTE - RAPID ASSESSMENT
34y F with PMHx of Hashimoto's disease, Hyperthyroidism, Asthma p/w worsening generalized weakness with increased thirst, SOB, HUTSON, muscle stiffness, hand tremors, nausea, and tachycardia x 2 months. Reports that sx started after she had gastroenteritis 2 months ago. Had similar sx in the [place]. Saw Endocrinologist last week, who told pt that she had abnormal TFTs. Denies CP, wheezing, fever, vomiting, dysuria, hematuria, or recent travel. Has 14 month child at home. LMP: last week.    **Pt seen in waiting room by Sharif PRATER), documentation completed by Cecily Higgins. Pt to be sent to main ED for further evaluation - all orders placed to be followed by MD in the main ED**

## 2020-03-02 NOTE — ED PROVIDER NOTE - OBJECTIVE STATEMENT
34F with PMH of Hashimoto's, hypothyroidism, and asthma p/w generalized weakness, increased thirst, SOB, and tachycardia x 2 months. Started shortly after an episode of gastroenteritis. Was told by endocrinologist last week that her TFTs were abnormal. Denies any chest pain, fever, vomiting, diarrhea, abd pain.

## 2020-03-02 NOTE — ED PROVIDER NOTE - ATTENDING CONTRIBUTION TO CARE
Attending MD Aguilar: I personally have seen and examined this patient.  Resident note reviewed and agree on plan of care and except where noted.  See below for details.     Seen in [place] 4    6 weeks    reports saw PMD 2 weeks after onset of symptoms, reports weakness, nausea, "always hot", unusual because always cold    Reports that she went to her PMD two weeks after symptoms started, reports PMD attributed symptoms to gastroenteritis.  Reports went to see endocrinologist at that time thyroid levels were checked and they were "high", reports typically they are "low".  Reports was told to modify her medications but reports that did not bring about any significant change in her symptoms.    Denies chest pain, shortness of breath.  Reports feels dyspnea on exertion.    Reports 3 days of bilateral lower extremity edema  Reports decided to come in today because shortness of breath worse Attending MD Aguilar: I personally have seen and examined this patient.  Resident note reviewed and agree on plan of care and except where noted.  See below for details.     Seen in [place] 4    34F with PMH/PSH including Hashimoto's, hypothyroidism, asthma presents to the ED with weakness, shortness of breath, tachycardia for at least 6 weeks.  Reports saw PMD 2 weeks after onset of symptoms, reports weakness, nausea, "always hot", unusual because always cold.  Reports PMD attributed symptoms to gastroenteritis.  Reports went to see endocrinologist at that time thyroid levels were checked and they were "high", reports typically they are "low".  Reports was told to modify her medications but reports that did not bring about any significant change in her symptoms.  Denies chest pain.  Reports feels dyspnea on exertion. Reports 3 days of bilateral lower extremity edema.  Reports decided to come in today because shortness of breath worse.  Denies fevers.  Denies nausea, vomiting, diarrhea.  On exam, NAD, head NCAT, PERRL, FROM at neck, no tenderness to midline palpation, no stepoffs along length of spine, lungs CTAB with good inspiratory effort, no wheezing, no rhonchi, no rales, no increased work of breathing, +S1S2, no m/r/g, abdomen soft with +BS, NT, ND, no CVAT, moving all extremities with 5/5 strength bilateral upper and lower extremities, good and equal  strength bilaterally, no calf tenderness, swelling, erythema or warmth; A/P: 34F with weakness, shortness of breath, tachycardia, suspect thyroid disorder, will await labs, CXR, EKG, IVFs, meds

## 2020-03-02 NOTE — ED PROVIDER NOTE - PHYSICAL EXAMINATION
Gen: AAOx3, non-toxic  Head: NCAT  HEENT: EOMI, oral mucosa moist, normal conjunctiva  Lung: CTAB, no respiratory distress, no wheezes/rhonchi/rales B/L, speaking in full sentences  CV: RRR, no murmurs, rubs or gallops  Abd: soft, NTND, no guarding, no CVA tenderness  MSK: no visible deformities  Neuro: No focal sensory or motor deficits, normal CN exam   Skin: Warm, well perfused, no rash  Psych: normal affect.     ~Tomasz Sauer PGY2

## 2020-03-02 NOTE — ED PROVIDER NOTE - NS_ ATTENDINGSCRIBEDETAILS _ED_A_ED_FT
The scribe's documentation has been prepared under my direction and personally reviewed by me in its entirety. I confirm that the note above accurately reflects all work, treatment, procedures, and medical decision making performed by me.  ARYA Rajan MD     I have performed a rapid screening assessment of this patient in the waiting room.  Additional evaluation and/or testing to be performed in the main Emergency Department by another attending. ARYA Rajan MD

## 2020-03-03 VITALS
SYSTOLIC BLOOD PRESSURE: 117 MMHG | OXYGEN SATURATION: 100 % | DIASTOLIC BLOOD PRESSURE: 50 MMHG | RESPIRATION RATE: 16 BRPM | HEART RATE: 100 BPM | TEMPERATURE: 98 F

## 2020-03-03 LAB
APPEARANCE UR: CLEAR — SIGNIFICANT CHANGE UP
BACTERIA # UR AUTO: ABNORMAL
BASOPHILS # BLD AUTO: 0.01 K/UL — SIGNIFICANT CHANGE UP (ref 0–0.2)
BASOPHILS NFR BLD AUTO: 0.3 % — SIGNIFICANT CHANGE UP (ref 0–2)
BILIRUB UR-MCNC: NEGATIVE — SIGNIFICANT CHANGE UP
COLOR SPEC: SIGNIFICANT CHANGE UP
DIFF PNL FLD: NEGATIVE — SIGNIFICANT CHANGE UP
EOSINOPHIL # BLD AUTO: 0.08 K/UL — SIGNIFICANT CHANGE UP (ref 0–0.5)
EOSINOPHIL NFR BLD AUTO: 2.2 % — SIGNIFICANT CHANGE UP (ref 0–6)
EPI CELLS # UR: 7 /HPF — HIGH
GLUCOSE UR QL: NEGATIVE — SIGNIFICANT CHANGE UP
HCG UR QL: NEGATIVE — SIGNIFICANT CHANGE UP
HCT VFR BLD CALC: 34.1 % — LOW (ref 34.5–45)
HGB BLD-MCNC: 10.8 G/DL — LOW (ref 11.5–15.5)
HYALINE CASTS # UR AUTO: 0 /LPF — SIGNIFICANT CHANGE UP (ref 0–2)
KETONES UR-MCNC: NEGATIVE — SIGNIFICANT CHANGE UP
LEUKOCYTE ESTERASE UR-ACNC: NEGATIVE — SIGNIFICANT CHANGE UP
LYMPHOCYTES # BLD AUTO: 1.98 K/UL — SIGNIFICANT CHANGE UP (ref 1–3.3)
LYMPHOCYTES # BLD AUTO: 55.3 % — HIGH (ref 13–44)
MCHC RBC-ENTMCNC: 27.5 PG — SIGNIFICANT CHANGE UP (ref 27–34)
MCHC RBC-ENTMCNC: 31.7 GM/DL — LOW (ref 32–36)
MCV RBC AUTO: 86.8 FL — SIGNIFICANT CHANGE UP (ref 80–100)
MONOCYTES # BLD AUTO: 0.5 K/UL — SIGNIFICANT CHANGE UP (ref 0–0.9)
MONOCYTES NFR BLD AUTO: 14 % — SIGNIFICANT CHANGE UP (ref 2–14)
NEUTROPHILS # BLD AUTO: 1.01 K/UL — LOW (ref 1.8–7.4)
NEUTROPHILS NFR BLD AUTO: 28.2 % — LOW (ref 43–77)
NITRITE UR-MCNC: NEGATIVE — SIGNIFICANT CHANGE UP
NRBC # BLD: 0 /100 WBCS — SIGNIFICANT CHANGE UP (ref 0–0)
PH UR: 6.5 — SIGNIFICANT CHANGE UP (ref 5–8)
PLATELET # BLD AUTO: 194 K/UL — SIGNIFICANT CHANGE UP (ref 150–400)
PROT UR-MCNC: SIGNIFICANT CHANGE UP
RBC # BLD: 3.93 M/UL — SIGNIFICANT CHANGE UP (ref 3.8–5.2)
RBC # FLD: 12.5 % — SIGNIFICANT CHANGE UP (ref 10.3–14.5)
RBC CASTS # UR COMP ASSIST: 4 /HPF — SIGNIFICANT CHANGE UP (ref 0–4)
SP GR SPEC: 1.02 — SIGNIFICANT CHANGE UP (ref 1.01–1.02)
T3 SERPL-MCNC: >650 NG/DL — HIGH (ref 80–200)
T4 AB SER-ACNC: 21.5 UG/DL — HIGH (ref 4.6–12)
TROPONIN T, HIGH SENSITIVITY RESULT: <6 NG/L — SIGNIFICANT CHANGE UP (ref 0–51)
TSH SERPL-MCNC: <0.01 UIU/ML — LOW (ref 0.27–4.2)
UROBILINOGEN FLD QL: NEGATIVE — SIGNIFICANT CHANGE UP
WBC # BLD: 3.58 K/UL — LOW (ref 3.8–10.5)
WBC # FLD AUTO: 3.58 K/UL — LOW (ref 3.8–10.5)
WBC UR QL: 1 /HPF — SIGNIFICANT CHANGE UP (ref 0–5)

## 2020-03-03 PROCEDURE — 80053 COMPREHEN METABOLIC PANEL: CPT

## 2020-03-03 PROCEDURE — 85610 PROTHROMBIN TIME: CPT

## 2020-03-03 PROCEDURE — 82550 ASSAY OF CK (CPK): CPT

## 2020-03-03 PROCEDURE — 85379 FIBRIN DEGRADATION QUANT: CPT

## 2020-03-03 PROCEDURE — 71046 X-RAY EXAM CHEST 2 VIEWS: CPT

## 2020-03-03 PROCEDURE — 84480 ASSAY TRIIODOTHYRONINE (T3): CPT

## 2020-03-03 PROCEDURE — 83735 ASSAY OF MAGNESIUM: CPT

## 2020-03-03 PROCEDURE — 84484 ASSAY OF TROPONIN QUANT: CPT

## 2020-03-03 PROCEDURE — 99283 EMERGENCY DEPT VISIT LOW MDM: CPT

## 2020-03-03 PROCEDURE — 84443 ASSAY THYROID STIM HORMONE: CPT

## 2020-03-03 PROCEDURE — 84436 ASSAY OF TOTAL THYROXINE: CPT

## 2020-03-03 PROCEDURE — 84100 ASSAY OF PHOSPHORUS: CPT

## 2020-03-03 PROCEDURE — 81001 URINALYSIS AUTO W/SCOPE: CPT

## 2020-03-03 PROCEDURE — 81025 URINE PREGNANCY TEST: CPT

## 2020-03-03 PROCEDURE — 85730 THROMBOPLASTIN TIME PARTIAL: CPT

## 2020-03-03 PROCEDURE — 93005 ELECTROCARDIOGRAM TRACING: CPT

## 2020-03-03 PROCEDURE — 71046 X-RAY EXAM CHEST 2 VIEWS: CPT | Mod: 26

## 2020-03-03 PROCEDURE — 85027 COMPLETE CBC AUTOMATED: CPT

## 2020-03-03 RX ORDER — IBUPROFEN 200 MG
600 TABLET ORAL ONCE
Refills: 0 | Status: COMPLETED | OUTPATIENT
Start: 2020-03-03 | End: 2020-03-03

## 2020-03-03 RX ORDER — SODIUM CHLORIDE 9 MG/ML
1000 INJECTION INTRAMUSCULAR; INTRAVENOUS; SUBCUTANEOUS ONCE
Refills: 0 | Status: COMPLETED | OUTPATIENT
Start: 2020-03-03 | End: 2020-03-03

## 2020-03-03 RX ADMIN — SODIUM CHLORIDE 1000 MILLILITER(S): 9 INJECTION INTRAMUSCULAR; INTRAVENOUS; SUBCUTANEOUS at 01:27

## 2020-03-04 NOTE — ED POST DISCHARGE NOTE - DETAILS
spoke with patient, went over results, she has an appointment with her endocrinologist tomorrow, at her request faxed thyroid panel results to Dr. Nicole [phone]

## 2020-03-05 ENCOUNTER — TRANSCRIPTION ENCOUNTER (OUTPATIENT)
Age: 35
End: 2020-03-05

## 2020-04-20 ENCOUNTER — APPOINTMENT (OUTPATIENT)
Dept: ANTEPARTUM | Facility: CLINIC | Age: 35
End: 2020-04-20
Payer: COMMERCIAL

## 2020-04-20 PROCEDURE — 76801 OB US < 14 WKS SINGLE FETUS: CPT

## 2020-05-15 ENCOUNTER — APPOINTMENT (OUTPATIENT)
Dept: ANTEPARTUM | Facility: CLINIC | Age: 35
End: 2020-05-15
Payer: COMMERCIAL

## 2020-05-15 PROCEDURE — 76801 OB US < 14 WKS SINGLE FETUS: CPT

## 2020-05-15 PROCEDURE — 76817 TRANSVAGINAL US OBSTETRIC: CPT

## 2020-05-20 ENCOUNTER — APPOINTMENT (OUTPATIENT)
Dept: ANTEPARTUM | Facility: CLINIC | Age: 35
End: 2020-05-20
Payer: COMMERCIAL

## 2020-05-20 ENCOUNTER — LABORATORY RESULT (OUTPATIENT)
Age: 35
End: 2020-05-20

## 2020-05-20 PROCEDURE — 76813 OB US NUCHAL MEAS 1 GEST: CPT

## 2020-06-23 ENCOUNTER — APPOINTMENT (OUTPATIENT)
Dept: ANTEPARTUM | Facility: CLINIC | Age: 35
End: 2020-06-23
Payer: COMMERCIAL

## 2020-06-23 PROCEDURE — 76811 OB US DETAILED SNGL FETUS: CPT

## 2020-06-26 ENCOUNTER — APPOINTMENT (OUTPATIENT)
Dept: PEDIATRIC CARDIOLOGY | Facility: CLINIC | Age: 35
End: 2020-06-26

## 2020-06-30 ENCOUNTER — APPOINTMENT (OUTPATIENT)
Dept: PEDIATRIC CARDIOLOGY | Facility: CLINIC | Age: 35
End: 2020-06-30
Payer: COMMERCIAL

## 2020-06-30 PROCEDURE — 93325 DOPPLER ECHO COLOR FLOW MAPG: CPT

## 2020-06-30 PROCEDURE — 76827 ECHO EXAM OF FETAL HEART: CPT

## 2020-06-30 PROCEDURE — 76825 ECHO EXAM OF FETAL HEART: CPT

## 2020-06-30 PROCEDURE — 99203 OFFICE O/P NEW LOW 30 MIN: CPT | Mod: 25

## 2020-07-01 ENCOUNTER — APPOINTMENT (OUTPATIENT)
Dept: ANTEPARTUM | Facility: CLINIC | Age: 35
End: 2020-07-01
Payer: COMMERCIAL

## 2020-07-01 ENCOUNTER — INPATIENT (INPATIENT)
Facility: HOSPITAL | Age: 35
LOS: 5 days | Discharge: HOME | End: 2020-07-07
Attending: SPECIALIST | Admitting: SPECIALIST
Payer: COMMERCIAL

## 2020-07-01 VITALS
HEIGHT: 55 IN | OXYGEN SATURATION: 100 % | SYSTOLIC BLOOD PRESSURE: 121 MMHG | HEART RATE: 80 BPM | RESPIRATION RATE: 16 BRPM | TEMPERATURE: 98 F | DIASTOLIC BLOOD PRESSURE: 43 MMHG | WEIGHT: 178.57 LBS

## 2020-07-01 DIAGNOSIS — Z04.9 ENCOUNTER FOR EXAMINATION AND OBSERVATION FOR UNSPECIFIED REASON: ICD-10-CM

## 2020-07-01 DIAGNOSIS — Z90.49 ACQUIRED ABSENCE OF OTHER SPECIFIED PARTS OF DIGESTIVE TRACT: Chronic | ICD-10-CM

## 2020-07-01 DIAGNOSIS — O99.282 ENDOCRINE, NUTRITIONAL AND METABOLIC DISEASES COMPLICATING PREGNANCY, SECOND TRIMESTER: ICD-10-CM

## 2020-07-01 DIAGNOSIS — N60.09 SOLITARY CYST OF UNSPECIFIED BREAST: Chronic | ICD-10-CM

## 2020-07-01 DIAGNOSIS — O36.8390 MATERNAL CARE FOR ABNORMALITIES OF THE FETAL HEART RATE OR RHYTHM, UNSPECIFIED TRIMESTER, NOT APPLICABLE OR UNSPECIFIED: ICD-10-CM

## 2020-07-01 PROCEDURE — 76817 TRANSVAGINAL US OBSTETRIC: CPT

## 2020-07-01 PROCEDURE — 76816 OB US FOLLOW-UP PER FETUS: CPT

## 2020-07-01 PROCEDURE — 99214 OFFICE O/P EST MOD 30 MIN: CPT

## 2020-07-01 RX ORDER — CALCIUM CARBONATE 500(1250)
1 TABLET ORAL
Refills: 0 | Status: DISCONTINUED | OUTPATIENT
Start: 2020-07-01 | End: 2020-07-07

## 2020-07-01 NOTE — OB RN PATIENT PROFILE - SPIRITUAL CULTURAL, CURRENT SITUATION, PROFILE
----- Message from Belgica Mcduffie sent at 4/12/2019  3:05 PM CDT -----  Contact: [phone] /self  Patient is requesting a call back. She needs to Dr. Abbasi only on Monday. Please call her.      none

## 2020-07-01 NOTE — OB PROVIDER H&P - HISTORY OF PRESENT ILLNESS
Patient is a 33 yo  at 18w1d LMP  with COURTNEY of 20 here with hyperthyroidism and fetal tachycardia. Patient diagnosed with hyperthyroidism in March and was started on propanolol for symptoms. She was not started on PTU and later that month discovered she was pregnant. She feels well today, complains of mild GERD. Denies fever, chills, SOB, abdominal pain, n/v, diarrhea. She was given 5mg of methimazole in the office prior to arrival at the hospital.    OBHx: 2019  FT 7lb c/b hypothyroidism on [place] thyroid  GynHx: denies  PMSHx: lap appy , lap agustina  2018 bl breast cyst aspiration no pathology per patient  Meds: Propanolol, Methimazole, PNV  All: morphine (hives)  Social: denies toxic exposures, feels safe at home, has hx of anxiety and depression denies meds  FHx: denies

## 2020-07-01 NOTE — OB PROVIDER H&P - NSHPLABSRESULTS_GEN_ALL_CORE
9.1                   Neurophils% (auto):   63.4   (07-02 @ 00:00):    8.66 )-----------(141          Lymphocytes% (auto):  28.4                                          28.7                   Eosinphils% (auto):   0.8      Manual%: Neutrophils x    ; Lymphocytes x    ; Eosinophils x    ; Bands%: x    ; Blasts x          07-02    137  |  106  |  8   ----------------------------<  95  4.1   |  19<L>  |  0.31<L>    Ca    8.7      02 Jul 2020 00:00    TPro  6.1  /  Alb  3.7  /  TBili  0.7  /  DBili  x   /  AST  33<H>  /  ALT  26  /  AlkPhos  118  07-02    LABS:  from outpatient office, thyroid studies  TSH <0.01  free T4 >7.77  free T3 25.0  From [facility] in 3/3/20  TSH <0.01  thyroxine 21.5  total T3 >650    Per Dr Moe MS-AFP elevated    bedside sono by Dr Bowles  subj low fluid  globular placenta  fetal tachycardia >200bpm

## 2020-07-01 NOTE — CONSULT NOTE ADULT - ASSESSMENT
A/P  35yo  @ 18+0 w hyperthyroidism, fetal tachycardia in previable period  Thyroid disease not currently controlled. Will send thyroid panel here including thyroid function and abs, cont pt on methimazole started today by Dr Hurtado.   Propranolol appropriate for symptomatic control, however hopefully as thyroid function normalizes will be able to decrease. DO NOT discontinue abruptly, please taper when able to decrease.  Will consult endocrinology for assistance in management.  Fetal tachycardia, etiology unknown. Possibly due to poorly controlled maternal disease, less likely fetal thyrotoxicosis. Given globular placenta, low fluid, tachycardia, elevated MSAFP, Dr Bowles also has some concern for possible placental disease in this pt being inciting factor.  Will obtain in house fetal echocardiogram ASAP. Dr Hurtado has spoken with Dr. Keith who communicated with pediatric EP evalaution team here at [facility].     Previable, fetal monitoring not required  Will perform full ATU scan in AM    Pt seen and evalauted w Dr Bowles    findings discussed with OB house staff

## 2020-07-01 NOTE — OB RN PATIENT PROFILE - NSMATERNALFETALCONCERNS_OBGYN_ALL_OB_FT
MATERNAL FETAL ALERT: 6/30/2020 LIMITED FETAL ECHO reveals fetal tachycardia - 206 bpm     MATERNAL hx of large uterine fibroid, hyperthyroid   **7/1/2020 *** advised to go to TRIAGE by Peds Cardiology Dr Johnson and MFM , Dr Jackson,  for evaluation and Peds Cardiology consult /follow up

## 2020-07-01 NOTE — OB PROVIDER H&P - PROBLEM SELECTOR PLAN 1
-thyroid panel here including thyroid function and abs  -cont pt on methimazole   -Propranolol appropriate for symptomatic control, taper when able to decrease.  -endocrinology for assistance in management.  -Fetal tachycardia, etiology unknown. Possibly due to poorly controlled maternal disease, less likely fetal thyrotoxicosis. Given globular placenta, low fluid, tachycardia, elevated MSAFP ?placental disease in  -fetal echocardiogram ASAP  -Previable, fetal monitoring not required  -Will perform full ATU scan in AM    Pt seen and evaluated w Dr. Bowles and Dr. Seun Lomeli PGY-3

## 2020-07-01 NOTE — OB RN PATIENT PROFILE - WEIGHT PRE-PREGNANCY IN KG
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 Ann Kwon is supervising provider today.    ECO - Fully active, able to carry on all predisease activities without restrictions.    Nursing Assessment:   A comprehensive nursing assessment addressing the side-effects of chemotherapy was performed and the patient reports the following:  ROS completed with patient and LPN.  RN discussed concerning issues with patient - no new problems.  RN in agreement with LPN assessment and documentation.    RN discussed with CEDRICK Gasca:  - Platelets 94 : OK for treatment today, will continue to monitor.  - Dehydration / Magnesium 1.4 : patient to receive 2 grams Magnesium Sulfate in 1 L NS over 90 minutes today in clinic - compatible with treatment.  - Diarrhea : patient to continue using Imodium for management.    Central Line Type: Port  Central Line Site: Right  and Chest  Port cleansed with ChloraPrep per protocol.  Accessed via: 20 gauge Astudillo needle  Patency Verification: Blood return greater or equal to 3 ml before, during and after treatment  Port flushed with: 10 ml 0.9 normal saline and 100 un/ml- 500 units heparin  Astudillo needle removed: Yes  Deaccess/site appearance: Clear (no redness, tenderness, or edema), dressing applied if required  Discharged: Stable and Follow up appointment scheduled    Pre-Treatment: - Treatment consent signed  - Patient has valid pre-authorization  - VS completed  - BSA double checked  - Premed orders, including hydration, are verified prior to administration  - Treatment parameters verified in patient protocol  - Chemotherapy doses independently doubled checked & verified by two practitioners  - Chemotherapy infusion pump settings are double checked & verified by two practitioners  - Patient is identified by first & last name, Date of birth that has been verified by two practitioners with the patient chairside.    Treatment:  Refer to LDA and MAR for line assessment and medication administration  Refer to Toxicity Assessment doc flowsheet   Blood return confirmed before, during and after chemotherapy administered  Infusion pump used for non-vesicant drugs    Post Treatment: Treatment tolerated well; no adverse reaction    Transfusion: Not needed    Integrative Medicine: No    Education: No new instructions needed    Next appointment scheduled: Monday 7/24/17 at 9:30 AM  Patient instructed to call the office with any questions or concerns.    Patient Discharged: patient discharged to home per self, ambulatory, to home       81

## 2020-07-01 NOTE — OB PROVIDER H&P - ASSESSMENT
35yo  @ 18w0 a/w hyperthyroidism, fetal tachycardia in previable period. Patient's thyroid disease not currently controlled.

## 2020-07-01 NOTE — OB PROVIDER H&P - NSHPPHYSICALEXAM_GEN_ALL_CORE
Vital Signs Last 24 Hrs  T(C): 36.3 (02 Jul 2020 01:24), Max: 36.8 (01 Jul 2020 21:42)  T(F): 97.3 (02 Jul 2020 01:24), Max: 98.3 (01 Jul 2020 21:42)  HR: 79 (02 Jul 2020 01:24) (77 - 80)  BP: 124/53 (02 Jul 2020 01:24) (113/42 - 124/53)  RR: 17 (02 Jul 2020 01:24) (16 - 17)  SpO2: 98% (02 Jul 2020 01:24) (98% - 100%)    Well appearing, NAD  Heart: RR, S1 and S2  Lungs: CTAB  Abd: soft, NT, gravid  VE deferred

## 2020-07-01 NOTE — OB PROVIDER H&P - NSMATERNALFETALCONCERNS_OBGYN_ALL_OB_FT
MATERNAL FETAL ALERT: 6/30/2020 LIMITED FETAL ECHO reveals fetal tachycardia - 206 bpm     MATERNAL hx of large uterine fibroid, hyperthyroid   **7/1/2020 *** advised to go to TRIAGE by Peds Cardiology Dr Johnson and MFM , Dr Hurtado,  for evaluation and Peds Cardiology consult /follow up

## 2020-07-01 NOTE — CONSULT NOTE ADULT - SUBJECTIVE AND OBJECTIVE BOX
HPI:  Presented for routine sono and MFM consult at [facility] today, was found to have fetal tachycardia to the 200s, difficult to determine but apparently sinus tachycardia. Pt feeling well w/o complaints, no palpitations, no n/v, HA, SOB, CP, abd pain, no fetal mvmt yet, no VB, LOF.  Pregnancy complicated by medical history of hyperthyroidism, has been treated through this pregnancy with increasing propranolol, no methimazole or PTU. Was previously hypothyroid in prior pregnancy. in 2020, 1 year after prior delivery, developed 30# weight loss and thyroid panel sent showed hyperthyroidism.  Given poorly controlled hyperthryoidism, was started today on methimazole 5mg    PAST MEDICAL & SURGICAL HISTORY:  Pregnancy: 15 weeks pregnant  Hashimoto's disease  Mild intermittent asthma without complication  Cyst of breast: 2 separate surgeries  left % right breast  S/P cholecystectomy  S/P appy      MEDICATIONS  (STANDING):  calcium carbonate    500 mG (Tums) Chewable 1 Tablet(s) Chew two times a day  methimazole 5 milliGRAM(s) Oral daily  prenatal multivitamin 1 Tablet(s) Oral daily  propranolol 20 5 times daily    Vital Signs Last 24 Hrs  T(C): 36.8 (01 Jul 2020 21:42), Max: 36.8 (01 Jul 2020 21:42)  T(F): 98.3 (01 Jul 2020 21:42), Max: 98.3 (01 Jul 2020 21:42)  HR: 80 (01 Jul 2020 21:42) (80 - 80)  BP: 121/43 (01 Jul 2020 21:42) (121/43 - 121/43)  RR: 16 (01 Jul 2020 21:42) (16 - 16)  SpO2: 100% (01 Jul 2020 21:42) (100% - 100%)    PHYSICAL EXAM:  Constitutional:  NAD  Respiratory:  no resp distress  Cardiovascular:  RRR  Gastrointestinal:  abd soft NT gravid  Genitourinary:  deferred  Extremities:  NT non edematous, thin, elongated fingers  LABORATORY:  LABS:  from outpatient office, thyroid studies  TSH <0.01  free T4 >7.77  free T3 25.0  From [facility] in [facility]/20  TSH <0.01  tyroxine 21.5  total T3 >650    Per Dr Moe MS-AFP elevated    bedside sono by Dr Bowles  subj low fluid  globular placenta  fetal tachycardia >200bpm

## 2020-07-02 ENCOUNTER — APPOINTMENT (OUTPATIENT)
Dept: ANTEPARTUM | Facility: HOSPITAL | Age: 35
End: 2020-07-02
Payer: COMMERCIAL

## 2020-07-02 ENCOUNTER — ASOB RESULT (OUTPATIENT)
Age: 35
End: 2020-07-02

## 2020-07-02 DIAGNOSIS — E05.90 THYROTOXICOSIS, UNSPECIFIED WITHOUT THYROTOXIC CRISIS OR STORM: ICD-10-CM

## 2020-07-02 LAB
ALBUMIN SERPL ELPH-MCNC: 3.7 G/DL — SIGNIFICANT CHANGE UP (ref 3.3–5)
ALP SERPL-CCNC: 118 U/L — SIGNIFICANT CHANGE UP (ref 40–120)
ALT FLD-CCNC: 26 U/L — SIGNIFICANT CHANGE UP (ref 4–33)
AMNISURE ROM (RUPTURE OF MEMBRANES): NEGATIVE — SIGNIFICANT CHANGE UP
ANION GAP SERPL CALC-SCNC: 12 MMO/L — SIGNIFICANT CHANGE UP (ref 7–14)
AST SERPL-CCNC: 33 U/L — HIGH (ref 4–32)
BASOPHILS # BLD AUTO: 0.02 K/UL — SIGNIFICANT CHANGE UP (ref 0–0.2)
BASOPHILS NFR BLD AUTO: 0.2 % — SIGNIFICANT CHANGE UP (ref 0–2)
BILIRUB SERPL-MCNC: 0.7 MG/DL — SIGNIFICANT CHANGE UP (ref 0.2–1.2)
BLD GP AB SCN SERPL QL: NEGATIVE — SIGNIFICANT CHANGE UP
BUN SERPL-MCNC: 8 MG/DL — SIGNIFICANT CHANGE UP (ref 7–23)
CALCIUM SERPL-MCNC: 8.7 MG/DL — SIGNIFICANT CHANGE UP (ref 8.4–10.5)
CHLORIDE SERPL-SCNC: 106 MMOL/L — SIGNIFICANT CHANGE UP (ref 98–107)
CO2 SERPL-SCNC: 19 MMOL/L — LOW (ref 22–31)
CREAT SERPL-MCNC: 0.31 MG/DL — LOW (ref 0.5–1.3)
EOSINOPHIL # BLD AUTO: 0.07 K/UL — SIGNIFICANT CHANGE UP (ref 0–0.5)
EOSINOPHIL NFR BLD AUTO: 0.8 % — SIGNIFICANT CHANGE UP (ref 0–6)
GLUCOSE SERPL-MCNC: 95 MG/DL — SIGNIFICANT CHANGE UP (ref 70–99)
HCT VFR BLD CALC: 28.7 % — LOW (ref 34.5–45)
HGB BLD-MCNC: 9.1 G/DL — LOW (ref 11.5–15.5)
IMM GRANULOCYTES NFR BLD AUTO: 0.3 % — SIGNIFICANT CHANGE UP (ref 0–1.5)
LYMPHOCYTES # BLD AUTO: 2.46 K/UL — SIGNIFICANT CHANGE UP (ref 1–3.3)
LYMPHOCYTES # BLD AUTO: 28.4 % — SIGNIFICANT CHANGE UP (ref 13–44)
MCHC RBC-ENTMCNC: 27.4 PG — SIGNIFICANT CHANGE UP (ref 27–34)
MCHC RBC-ENTMCNC: 31.7 % — LOW (ref 32–36)
MCV RBC AUTO: 86.4 FL — SIGNIFICANT CHANGE UP (ref 80–100)
MONOCYTES # BLD AUTO: 0.6 K/UL — SIGNIFICANT CHANGE UP (ref 0–0.9)
MONOCYTES NFR BLD AUTO: 6.9 % — SIGNIFICANT CHANGE UP (ref 2–14)
NEUTROPHILS # BLD AUTO: 5.48 K/UL — SIGNIFICANT CHANGE UP (ref 1.8–7.4)
NEUTROPHILS NFR BLD AUTO: 63.4 % — SIGNIFICANT CHANGE UP (ref 43–77)
NRBC # FLD: 0 K/UL — SIGNIFICANT CHANGE UP (ref 0–0)
PLATELET # BLD AUTO: 141 K/UL — LOW (ref 150–400)
PMV BLD: 12 FL — SIGNIFICANT CHANGE UP (ref 7–13)
POTASSIUM SERPL-MCNC: 4.1 MMOL/L — SIGNIFICANT CHANGE UP (ref 3.5–5.3)
POTASSIUM SERPL-SCNC: 4.1 MMOL/L — SIGNIFICANT CHANGE UP (ref 3.5–5.3)
PROT SERPL-MCNC: 6.1 G/DL — SIGNIFICANT CHANGE UP (ref 6–8.3)
RBC # BLD: 3.32 M/UL — LOW (ref 3.8–5.2)
RBC # FLD: 16.2 % — HIGH (ref 10.3–14.5)
RH IG SCN BLD-IMP: POSITIVE — SIGNIFICANT CHANGE UP
RH IG SCN BLD-IMP: POSITIVE — SIGNIFICANT CHANGE UP
SARS-COV-2 RNA SPEC QL NAA+PROBE: SIGNIFICANT CHANGE UP
SODIUM SERPL-SCNC: 137 MMOL/L — SIGNIFICANT CHANGE UP (ref 135–145)
T PALLIDUM AB TITR SER: NEGATIVE — SIGNIFICANT CHANGE UP
T3 SERPL-MCNC: 494.4 NG/DL — HIGH (ref 80–200)
T4 AB SER-ACNC: 24.86 UG/DL — HIGH (ref 5.1–13)
T4 FREE SERPL-MCNC: 7.77 NG/DL — HIGH (ref 0.9–1.8)
THYROPEROXIDASE AB SERPL-ACNC: 1090 IU/ML — HIGH
TSH SERPL-MCNC: < 0.1 UIU/ML — LOW (ref 0.27–4.2)
WBC # BLD: 8.66 K/UL — SIGNIFICANT CHANGE UP (ref 3.8–10.5)
WBC # FLD AUTO: 8.66 K/UL — SIGNIFICANT CHANGE UP (ref 3.8–10.5)

## 2020-07-02 PROCEDURE — 76826 ECHO EXAM OF FETAL HEART: CPT | Mod: 26

## 2020-07-02 PROCEDURE — 99255 IP/OBS CONSLTJ NEW/EST HI 80: CPT | Mod: GC

## 2020-07-02 PROCEDURE — 76815 OB US LIMITED FETUS(S): CPT | Mod: 26

## 2020-07-02 PROCEDURE — 99251: CPT | Mod: 25

## 2020-07-02 PROCEDURE — 93325 DOPPLER ECHO COLOR FLOW MAPG: CPT | Mod: 26

## 2020-07-02 PROCEDURE — 76817 TRANSVAGINAL US OBSTETRIC: CPT | Mod: 26

## 2020-07-02 PROCEDURE — 76828 ECHO EXAM OF FETAL HEART: CPT | Mod: 26

## 2020-07-02 RX ORDER — DIPHENHYDRAMINE HCL 50 MG
25 CAPSULE ORAL ONCE
Refills: 0 | Status: COMPLETED | OUTPATIENT
Start: 2020-07-02 | End: 2020-07-02

## 2020-07-02 RX ORDER — ALBUTEROL 90 UG/1
2 AEROSOL, METERED ORAL EVERY 6 HOURS
Refills: 0 | Status: DISCONTINUED | OUTPATIENT
Start: 2020-07-02 | End: 2020-07-07

## 2020-07-02 RX ADMIN — Medication 25 MILLIGRAM(S): at 22:22

## 2020-07-02 RX ADMIN — Medication 1 TABLET(S): at 15:11

## 2020-07-02 RX ADMIN — Medication 1 TABLET(S): at 00:24

## 2020-07-02 RX ADMIN — Medication 1 TABLET(S): at 12:17

## 2020-07-02 NOTE — PROGRESS NOTE ADULT - SUBJECTIVE AND OBJECTIVE BOX
R3 Antepartum Note, HD#2    Interval events:    Patient seen and examined at bedside, no acute overnight events. No acute complaints. Denies palpitations. Denies chest pain. Pt reports +FM, denies LOF, VB, ctx, HA, epigastric pain, blurred vision, SOB, N/V, fevers, and chills.    Vital Signs Last 24 Hours  T(C): 36.8 (07-02-20 @ 05:51), Max: 36.8 (07-01-20 @ 21:42)  HR: 81 (07-02-20 @ 05:51) (77 - 81)  BP: 113/47 (07-02-20 @ 05:51) (113/42 - 124/53)  RR: 17 (07-02-20 @ 05:51) (16 - 17)  SpO2: 98% (07-02-20 @ 05:51) (98% - 100%)    CAPILLARY BLOOD GLUCOSE          Physical Exam:  General: NAD  Abdomen: Soft, non-tender, gravid  Ext: No pain or swelling        Labs:             9.1    8.66  )-----------( 141      ( 07-02 @ 00:00 )             28.7     07-02 @ 00:00    137  |  106  |  8   ----------------------------<  95  4.1   |  19  |  0.31    Ca    8.7      07-02 @ 00:00    TPro  6.1  /  Alb  3.7  /  TBili  0.7  /  DBili  x   /  AST  33  /  ALT  26  /  AlkPhos  118  07-02 @ 00:00            MEDICATIONS  (STANDING):  calcium carbonate    500 mG (Tums) Chewable 1 Tablet(s) Chew two times a day  methimazole 5 milliGRAM(s) Oral daily  prenatal multivitamin 1 Tablet(s) Oral daily  propranolol 20 milliGRAM(s) Oral four times a day    MEDICATIONS  (PRN):  ALBUTerol    90 MICROgram(s) HFA Inhaler 2 Puff(s) Inhalation every 6 hours PRN Shortness of Breath and/or Wheezing

## 2020-07-02 NOTE — CONSULT NOTE ADULT - PROBLEM SELECTOR RECOMMENDATION 9
- give stat dose of 20mg methimazole now, then start methimazole 30mg daily  - f/u Free T4, TSI, TSH receptor Ab, TG Ab  - obtain thyroid U/S given nodularity on thyroid exam to r/o toxic nodule/MNG  - recommend continuing propanolol 10mg Q8hr with holding parameters   - pt prefers to follow up with [facility] outpatient. Address: [address] [place] [address], [address], [place] [address]. Ph#: [phone] -Highly suspicious for Graves' disease  - give stat dose of 20mg methimazole now, then start methimazole 30mg daily  - f/u Free T4, TSI, TSH receptor Ab, TG Ab  - obtain thyroid U/S given nodularity on thyroid exam to r/o toxic nodule/MNG  - recommend continuing propanolol 10mg Q8hr with holding parameters   - pt prefers to follow up with [facility] outpatient. Address: [address] [place] [address], [address], [place] [address]. Ph#: [phone] - Highly suspicious for Graves' disease  - Jennings Wartofsky score: 10-20 (does not meet criteria for thyroid storm)  - give stat dose of 20mg methimazole now, then start methimazole 30mg daily  - f/u Free T4, TSI, TSH receptor Ab, TG Ab  - obtain thyroid U/S given nodularity on thyroid exam to r/o toxic nodule/MNG  - recommend continuing propanolol 10mg Q8hr with holding parameters   - repeat free T4 and total T3 tomorrow 7/3 and Sunday 7/5 to assess response to methimazole  - pt prefers to follow up with [facility] outpatient. Address: [address] [place] [address], [address], [place] [address]. Ph#: [phone]

## 2020-07-02 NOTE — CHART NOTE - NSCHARTNOTEFT_GEN_A_CORE
Melanie note:    Patient seen and examined to r/o PPPROM after ATU sono revealed MVP of 3. Pt denies gushes of fluid, however reports having more vaginal discharge than usual. Pt denies VB. Endorses +FM. Denies contx.     PE:  Vital Signs Last 24 Hrs  T(C): 36.7 (02 Jul 2020 10:18), Max: 36.8 (01 Jul 2020 21:42)  T(F): 98.1 (02 Jul 2020 10:18), Max: 98.3 (01 Jul 2020 21:42)  HR: 79 (02 Jul 2020 10:18) (77 - 81)  BP: 112/32 (02 Jul 2020 12:19) (112/32 - 124/53)  BP(mean): --  RR: 16 (02 Jul 2020 10:18) (16 - 17)  SpO2: 98% (02 Jul 2020 10:18) (98% - 100%)  SSE: negative for pooping/ small amount of leukorrhea noted in vaginal canal/ negative nitrazine/ negative ferning- Amnisure sent    Plan:  - continue to monitor  - f/u amnisure result    neil Cuba [facility] Melanie note:    Patient seen and examined to r/o PPPROM after ATU sono revealed MVP of 3. Pt denies gushes of fluid, however reports having more vaginal discharge than usual. Pt denies VB. Endorses +FM. Denies contx.     PE:  Vital Signs Last 24 Hrs  T(C): 36.7 (02 Jul 2020 10:18), Max: 36.8 (01 Jul 2020 21:42)  T(F): 98.1 (02 Jul 2020 10:18), Max: 98.3 (01 Jul 2020 21:42)  HR: 79 (02 Jul 2020 10:18) (77 - 81)  BP: 112/32 (02 Jul 2020 12:19) (112/32 - 124/53)  BP(mean): --  RR: 16 (02 Jul 2020 10:18) (16 - 17)  SpO2: 98% (02 Jul 2020 10:18) (98% - 100%)  SSE: negative for pooling/ small amount of leukorrhea noted in vaginal canal/ negative nitrazine/ negative ferning- Amnisure sent    Plan:  - continue to monitor  - f/u amnisure result    neil Cuba [facility]

## 2020-07-02 NOTE — PROGRESS NOTE ADULT - PROBLEM SELECTOR PLAN 1
-T4: 25  TSH: < 0.1, thyroid antibodies pending  -continue on methimazole   -Propranolol appropriate for symptomatic control, taper when able to decrease.  -endocrinology consult placed, awaiting recommendations  -Fetal tachycardia, etiology unknown. Possibly due to poorly controlled maternal disease, less likely fetal thyrotoxicosis. Given globular placenta, low fluid, tachycardia, elevated MSAFP ?placental disease vs partial mole  -fetal echocardiogram ASAP, peds cardiology aware, awaiting recommendations  -Previable, fetal monitoring not required  -For full ATU scan today      jasmin pgy3

## 2020-07-02 NOTE — CONSULT NOTE ADULT - PROBLEM SELECTOR RECOMMENDATION 2
Need to optimize thyroid status of mother to enable improvement in fetal tachycardia.  Proceed with plan as outlined above.

## 2020-07-02 NOTE — CONSULT NOTE ADULT - CONSULT REASON
Hyperthyroidism
33yo  @ 18 weeks 0 days (COURTNEY 20 by 1st tri sono) w hyperthyroidism, new onset fetal tachycardia

## 2020-07-02 NOTE — PROGRESS NOTE ADULT - SUBJECTIVE AND OBJECTIVE BOX
MFM DAILY PROGRESS NOTE:     S:  Patient is a 35 yo  at 18w1d LMP  with COURTNEY of 20 here with hyperthyroidism and fetal tachycardia. Patient diagnosed with hyperthyroidism in March and was started on propanolol for symptoms. She was not started on PTU and later that month discovered she was pregnant. She feels well today, complains of mild GERD. Denies fever, chills, SOB, abdominal pain, n/v, diarrhea. She was given 5mg of methimazole in the office prior to arrival at the hospital.    OBHx: 2019  FT 7lb c/b hypothyroidism (Hashimitos)  GynHx: denies  PMSHx: lap appy , lap agustina 2013 bl breast cyst aspiration no pathology per patient  Meds: Propanolol, Methimazole, PNV  All: morphine (hives)  Social: denies toxic exposures, feels safe at home, has hx of anxiety and depression denies meds  FHx: denies (2020 23:18)      OBJECTIVE:   Vital Signs Last 24 Hrs  T(C): 36.7 (2020 10:18), Max: 36.8 (2020 21:42)  T(F): 98.1 (2020 10:18), Max: 98.3 (2020 21:42)  HR: 79 (2020 10:18) (77 - 81)  BP: 114/46 (2020 10:18) (113/42 - 124/53)  BP(mean): --  RR: 16 (2020 10:18) (16 - 17)  SpO2: 98% (2020 10:18) (98% - 100%)    PHYSICAL EXAM:      Constitutional: alert and oriented x 3    Breasts: no tenderness, no nodules    Respiratory: clear    Cardiovascular: regular rate and rhythm    Gastrointestinal: soft, non tender, + bowel sounds. No hepatosplenomegaly, no palpable masses    Genitourinary: NEFG  Cervix: closed/ long, no CMT  Uterus: normal size, non tender  Adnexa: non tender, no palpable masses    Rectal:     Extremities:    Neurological:    Skin:    Lymph Nodes:        EFM/TOCO:     LABS:                        9.1    8.66  )-----------( 141      ( 2020 00:00 )             28.7     07-02    137  |  106  |  8   ----------------------------<  95  4.1   |  19<L>  |  0.31<L>    Ca    8.7      2020 00:00    TPro  6.1  /  Alb  3.7  /  TBili  0.7  /  DBili  x   /  AST  33<H>  /  ALT  26  /  AlkPhos  118            RADIOLOGY & ADDITIONAL STUDIES:

## 2020-07-02 NOTE — CONSULT NOTE ADULT - SUBJECTIVE AND OBJECTIVE BOX
35 yo  currently 18 weeks pregnant, who was sent in from OBGYN office due to finding of fetal tachycardia on routine sonogram. Pt was found to be hyperthyroid with TSH<0.1, total T4 24.86, total T3 494. Endocrine was consulted for hyperthyroidism.     Endocrine history:  Pt reports being diagnosed with Hashimoto's thyroiditis 8-10 yrs ago. She reported having symptoms of sever fatigue and weight gain at that time. Initially was not started on treatment due to supposedly normal TFTs per pt, but was later started on armor thyroid in  by OBGYN Endocrinologist. She states symptoms improved with armor thyroid. States she gave birth to first child in 2019 and armor thyroid dose was adjusted during pregnancy and post-partum with the most recent dose being 180mg. She states in February of this year  she developed symptoms of fatigue, anxiousness, heart racing, and heavy breathing. Her symptoms became so severe that she was hospitalized in 2020. She was found to be hyperthyroid at that time and was started on propanolol with up-titration of dose to 4 times a day without significant improvement in symptoms. Later that month she discovered she was pregnant. Of note, patient was noted to have Free T4 level of 7.7 on outpatient laboratory work up in 2020.    Hospital course:  Pt was given 5mg of methimazole in the office prior to arrival at the hospital. Here, pt was started on 10mg methimazole daily and 20mg propanolol Q6hr.    Pt currently reports that she feels like her heart is beating fast and that she is breathing heavy. She denies diarrhea but admits to increased frequency of defecation. Admits to feeling very hot often.      PAST MEDICAL & SURGICAL HISTORY:  Pregnancy: 15 weeks pregnant  Hashimoto's disease  Mild intermittent asthma without complication  Cyst of breast: 2 separate surgeries  left &amp; right breast  S/P cholecystectomy  S/P appy      FAMILY HISTORY: No pertinent family history in first degree relatives    Social History: Denies tobacco, alcohol, or illicit drug use.    Outpatient Medications: Home Medications:    MEDICATIONS  (STANDING):  calcium carbonate    500 mG (Tums) Chewable 1 Tablet(s) Chew two times a day  methimazole 20 milliGRAM(s) Oral once  prenatal multivitamin 1 Tablet(s) Oral daily  propranolol 20 milliGRAM(s) Oral four times a day    MEDICATIONS  (PRN):  ALBUTerol    90 MICROgram(s) HFA Inhaler 2 Puff(s) Inhalation every 6 hours PRN Shortness of Breath and/or Wheezing      Allergies    codeine (Pruritus)  morphine (Vomiting; Nausea)    Intolerances      Review of Systems:  Constitutional: No fever, chills   Neuro: No tremors, headache   Cardiovascular: No chest pain, +palpitations  Respiratory: +heavy breathing  GI: +nausea, denies vomiting, abdominal pain  : No dysuria, polyuria   Skin: no rash, ulcers   Psych: +anxiety   Endocrine: no polyphagia, polydipsia     ALL OTHER SYSTEMS REVIEWED AND NEGATIVE    PHYSICAL EXAM:    VITALS: T(C): 36.9 (20 @ 17:42)  T(F): 98.4 (20 @ 17:42), Max: 98.4 (20 @ 17:42)  HR: 85 (20 @ 17:42) (77 - 85)  BP: 144/55 (20 @ 17:42) (112/32 - 144/55)  RR:  (16 - 17)  SpO2:  (98% - 100%)    general: slightly anxious appearing, NAD  HEENT: +lid lag, +mild proptosis. diffusely enlarged thyroid gland, mildly tender  Heart: RRR, no murmurs  Lungs: CTA b/l  Abd: gravid uterus, NT  LEs: +LE edema b/l, no calf tenderness  neuro: +hand tremor    LABS:             9.1    8.66  )-----------( 141      ( 2020 00:00 )             28.7           137  |  106  |  8   ----------------------------<  95  4.1   |  19<L>  |  0.31<L>    EGFR if : 174  EGFR if non : 150    Ca    8.7          TPro  6.1  /  Alb  3.7  /  TBili  0.7  /  DBili  x   /  AST  33<H>  /  ALT  26  /  AlkPhos  118        Thyroid Function Tests:   @ 00:00 TSH < 0.10 FreeT4 -- T3 494.4 Anti TPO 1090.0 Anti Thyroglobulin Ab -- TSI -- 35 yo  currently 18 weeks pregnant, who was sent in from OBGYN office due to finding of fetal tachycardia on routine sonogram. Pt was found to be hyperthyroid with TSH<0.1, total T4 24.86, total T3 494. Endocrine was consulted for hyperthyroidism.     Endocrine history:  Pt reports being diagnosed with Hashimoto's thyroiditis 8-10 yrs ago. She reported having symptoms of sever fatigue and weight gain at that time. Initially was not started on treatment due to supposedly normal TFTs per pt, but was later started on armor thyroid in 2018 by OBGYN Endocrinologist. She states symptoms improved with armor thyroid. States she gave birth to first child in 2019 and armor thyroid dose was adjusted during pregnancy and post-partum with the most recent dose being 180mg. She states in February of this year  she developed symptoms of fatigue, anxiousness, heart racing, and heavy breathing. Also admitted to significant weight loss. Her symptoms became so severe that she was hospitalized in 2020. She was found to be hyperthyroid at that time and was started on propanolol with up-titration of dose to 4 times a day without significant improvement in symptoms. Later that month she discovered she was pregnant. Of note, patient was noted to have Free T4 level of 7.7 on outpatient laboratory work up in 2020.    Hospital course:  Pt was given 5mg of methimazole in the office prior to arrival at the hospital. Here, pt was started on 10mg methimazole daily and 20mg propanolol Q6hr.    Pt currently reports that she feels like her heart is beating fast and that she is breathing heavy. She denies diarrhea but admits to increased frequency of defecation. Admits to feeling very hot often.      PAST MEDICAL & SURGICAL HISTORY:  Pregnancy: 15 weeks pregnant  Hashimoto's disease  Mild intermittent asthma without complication  Cyst of breast: 2 separate surgeries  left &amp; right breast  S/P cholecystectomy  S/P appy      FAMILY HISTORY: No pertinent family history in first degree relatives    Social History: Denies tobacco, alcohol, or illicit drug use.    Outpatient Medications: Home Medications:    MEDICATIONS  (STANDING):  calcium carbonate    500 mG (Tums) Chewable 1 Tablet(s) Chew two times a day  methimazole 20 milliGRAM(s) Oral once  prenatal multivitamin 1 Tablet(s) Oral daily  propranolol 20 milliGRAM(s) Oral four times a day    MEDICATIONS  (PRN):  ALBUTerol    90 MICROgram(s) HFA Inhaler 2 Puff(s) Inhalation every 6 hours PRN Shortness of Breath and/or Wheezing      Allergies    codeine (Pruritus)  morphine (Vomiting; Nausea)    Intolerances      Review of Systems:  Constitutional: No fever, chills   Neuro: No tremors, headache   Cardiovascular: No chest pain, +palpitations  Respiratory: +heavy breathing  GI: +nausea, denies vomiting, abdominal pain  : No dysuria, polyuria   Skin: no rash, ulcers   Psych: +anxiety   Endocrine: no polyphagia, polydipsia     ALL OTHER SYSTEMS REVIEWED AND NEGATIVE    PHYSICAL EXAM:    VITALS: T(C): 36.9 (20 @ 17:42)  T(F): 98.4 (20 @ 17:42), Max: 98.4 (20 @ 17:42)  HR: 85 (20 @ 17:42) (77 - 85)  BP: 144/55 (20 @ 17:42) (112/32 - 144/55)  RR:  (16 - 17)  SpO2:  (98% - 100%)    general: slightly anxious appearing, NAD  HEENT: +lid lag, +mild proptosis. diffusely enlarged thyroid gland, mildly tender  Heart: RRR, no murmurs  Lungs: CTA b/l  Abd: gravid uterus, NT  LEs: +LE edema b/l, no calf tenderness  neuro: +hand tremor    LABS:             9.1    8.66  )-----------( 141      ( 2020 00:00 )             28.7           137  |  106  |  8   ----------------------------<  95  4.1   |  19<L>  |  0.31<L>    EGFR if : 174  EGFR if non : 150    Ca    8.7          TPro  6.1  /  Alb  3.7  /  TBili  0.7  /  DBili  x   /  AST  33<H>  /  ALT  26  /  AlkPhos  118        Thyroid Function Tests:   @ 00:00 TSH < 0.10 FreeT4 -- T3 494.4 Anti TPO 1090.0 Anti Thyroglobulin Ab -- TSI -- 33 yo  currently 18 weeks pregnant, who was sent in from OBGYN office due to finding of fetal tachycardia on routine sonogram. Pt was found to be hyperthyroid with TSH<0.1, total T4 24.86, total T3 494. Endocrine was consulted for hyperthyroidism.     Endocrine history:  Pt reports being diagnosed with Hashimoto's thyroiditis 8-10 yrs ago. She reported having symptoms of sever fatigue and weight gain at that time. Initially was not started on treatment due to supposedly normal TFTs per pt, but was later started on armor thyroid in 2018 by OBGYN Endocrinologist. She states symptoms improved with armor thyroid. States she gave birth to first child in 2019 and armor thyroid dose was adjusted during pregnancy and post-partum with the most recent dose being 180mg. She states in February of this year  she developed symptoms of fatigue, anxiousness, heart racing, and heavy breathing. Also admitted to significant weight loss. Her symptoms became so severe that she was hospitalized in 2020. She was found to be hyperthyroid at that time and was started on propanolol with up-titration of dose to 4 times a day without significant improvement in symptoms. Later that month she discovered she was pregnant. Of note, patient was noted to have Free T4 level of 7.7 on outpatient laboratory work up in 2020.    Hospital course:  Pt was given 5mg of methimazole in the office prior to arrival at the hospital. Here, pt was started on 10mg methimazole daily and 20mg propanolol Q6hr.    Pt currently reports that she feels like her heart is beating fast and that she is breathing heavy. She denies diarrhea but admits to increased frequency of defecation. Admits to feeling very hot often.      PAST MEDICAL & SURGICAL HISTORY:  Pregnancy: 15 weeks pregnant  Hashimoto's disease  Mild intermittent asthma without complication  Cyst of breast: 2 separate surgeries  left &amp; right breast  S/P cholecystectomy  S/P appy      FAMILY HISTORY: No pertinent family history in first degree relatives    Social History: Denies tobacco, alcohol, or illicit drug use.    Outpatient Medications: Home Medications:    MEDICATIONS  (STANDING):  calcium carbonate    500 mG (Tums) Chewable 1 Tablet(s) Chew two times a day  methimazole 20 milliGRAM(s) Oral once  prenatal multivitamin 1 Tablet(s) Oral daily  propranolol 20 milliGRAM(s) Oral four times a day    MEDICATIONS  (PRN):  ALBUTerol    90 MICROgram(s) HFA Inhaler 2 Puff(s) Inhalation every 6 hours PRN Shortness of Breath and/or Wheezing      Allergies    codeine (Pruritus)  morphine (Vomiting; Nausea)    Intolerances      Review of Systems:  Constitutional: No fever, chills   Neuro: No tremors, headache   Cardiovascular: No chest pain, +palpitations  Respiratory: +heavy breathing  GI: +nausea, denies vomiting, abdominal pain  : No dysuria, polyuria   Skin: no rash, ulcers   Psych: +anxiety   Endocrine: no polyphagia, polydipsia     ALL OTHER SYSTEMS REVIEWED AND NEGATIVE    PHYSICAL EXAM:    VITALS: T(C): 36.9 (20 @ 17:42)  T(F): 98.4 (20 @ 17:42), Max: 98.4 (20 @ 17:42)  HR: 85 (20 @ 17:42) (77 - 85)  BP: 144/55 (20 @ 17:42) (112/32 - 144/55)  RR:  (16 - 17)  SpO2:  (98% - 100%)    general: slightly anxious appearing, NAD  HEENT: +lid lag, +mild proptosis.   Thyroid: diffusely enlarged thyroid gland 40g, mildly tender  Heart: RRR, no murmurs  Lungs: CTA b/l, nonlabored respirations  Abd: gravid uterus, NT  LEs: +LE edema b/l, no calf tenderness  neuro: +hand tremor    LABS:             9.1    8.66  )-----------( 141      ( 2020 00:00 )             28.7           137  |  106  |  8   ----------------------------<  95  4.1   |  19<L>  |  0.31<L>    EGFR if : 174  EGFR if non : 150    Ca    8.7          TPro  6.1  /  Alb  3.7  /  TBili  0.7  /  DBili  x   /  AST  33<H>  /  ALT  26  /  AlkPhos  118        Thyroid Function Tests:   @ 00:00 TSH < 0.10 FreeT4 -- T3 494.4 Anti TPO 1090.0 Anti Thyroglobulin Ab -- TSI -- 35 yo  currently 18 weeks pregnant, who was sent in from OBGYN office due to finding of fetal tachycardia on routine sonogram. Pt was found to be hyperthyroid with TSH<0.1, total T4 24.86, total T3 494. Endocrine was consulted for hyperthyroidism.     Endocrine history:  Pt reports being diagnosed with Hashimoto's thyroiditis 8-10 yrs ago. She reported having symptoms of sever fatigue and weight gain at that time. Initially was not started on treatment due to supposedly normal TFTs per pt, but was later started on armor thyroid in 2018 by OBGYN Endocrinologist. She states symptoms improved with armor thyroid. States she gave birth to first child in 2019 and armor thyroid dose was adjusted during pregnancy and post-partum with the most recent dose being 180mg. She states in February of this year  she developed symptoms of fatigue, anxiousness, heart racing, and heavy breathing. Also admitted to significant weight loss. Her symptoms became so severe that she was hospitalized in 2020. She was found to be hyperthyroid at that time and was started on propanolol with up-titration of dose to 4 times a day without significant improvement in symptoms. Later that month she discovered she was pregnant. Of note, patient was noted to have Free T4 level of 7.7 on outpatient laboratory work up in 2020.    Hospital course:  Pt was given 5mg of methimazole in the office prior to arrival at the hospital. Here, pt was started on 10mg methimazole daily and 20mg propanolol Q6hr.    Pt currently reports that she feels like her heart is beating fast and that she is breathing heavy. She denies diarrhea but admits to increased frequency of defecation. Admits to feeling very hot often.      PAST MEDICAL & SURGICAL HISTORY:  Pregnancy: 15 weeks pregnant  Hashimoto's disease  Mild intermittent asthma without complication  Cyst of breast: 2 separate surgeries  left &amp; right breast  S/P cholecystectomy  S/P appy      FAMILY HISTORY: No pertinent family history in first degree relatives    Social History: Denies tobacco, alcohol, or illicit drug use.    Outpatient Medications: Home Medications: 20mg propanolol 5 times a day    MEDICATIONS  (STANDING):  calcium carbonate    500 mG (Tums) Chewable 1 Tablet(s) Chew two times a day  methimazole 20 milliGRAM(s) Oral once  prenatal multivitamin 1 Tablet(s) Oral daily  propranolol 20 milliGRAM(s) Oral four times a day    MEDICATIONS  (PRN):  ALBUTerol    90 MICROgram(s) HFA Inhaler 2 Puff(s) Inhalation every 6 hours PRN Shortness of Breath and/or Wheezing      Allergies    codeine (Pruritus)  morphine (Vomiting; Nausea)    Intolerances      Review of Systems:  Constitutional: No fever, chills   Neuro: No tremors, headache   Cardiovascular: No chest pain, +palpitations  Respiratory: +heavy breathing  GI: +nausea, denies vomiting, abdominal pain  : No dysuria, polyuria   Skin: no rash, ulcers   Psych: +anxiety   Endocrine: no polyphagia, polydipsia     ALL OTHER SYSTEMS REVIEWED AND NEGATIVE    PHYSICAL EXAM:    VITALS: T(C): 36.9 (20 @ 17:42)  T(F): 98.4 (20 @ 17:42), Max: 98.4 (20 @ 17:42)  HR: 85 (20 @ 17:42) (77 - 85)  BP: 144/55 (20 @ 17:42) (112/32 - 144/55)  RR:  (16 - 17)  SpO2:  (98% - 100%)    general: slightly anxious appearing, NAD  HEENT: +lid lag, +mild proptosis.   Thyroid: diffusely enlarged thyroid gland 40g, mildly tender  Heart: RRR, no murmurs  Lungs: CTA b/l, nonlabored respirations  Abd: gravid uterus, NT  LEs: +LE edema b/l, no calf tenderness  neuro: +hand tremor    LABS:             9.1    8.66  )-----------( 141      ( 2020 00:00 )             28.7           137  |  106  |  8   ----------------------------<  95  4.1   |  19<L>  |  0.31<L>    EGFR if : 174  EGFR if non : 150    Ca    8.7          TPro  6.1  /  Alb  3.7  /  TBili  0.7  /  DBili  x   /  AST  33<H>  /  ALT  26  /  AlkPhos  118        Thyroid Function Tests:   @ 00:00 TSH < 0.10 FreeT4 -- T3 494.4 Anti TPO 1090.0 Anti Thyroglobulin Ab -- TSI -- 33 yo  currently 18 weeks pregnant, who was sent in from OBGYN office due to finding of fetal tachycardia on routine sonogram. Pt was found to be hyperthyroid with TSH<0.1, total T4 24.86, total T3 494. Endocrine was consulted for hyperthyroidism.     Endocrine history:  Pt reports being diagnosed with Hashimoto's thyroiditis 8-10 yrs ago. She reported having symptoms of sever fatigue and weight gain at that time. Initially was not started on treatment due to supposedly normal TFTs per pt, but was later started on armor thyroid in  by OBGYN Endocrinologist. She states symptoms improved with armor thyroid. States she gave birth to first child in 2019 and armor thyroid dose was adjusted during pregnancy and post-partum with the most recent dose being 180mg. She states in February of this year  she developed symptoms of fatigue, anxiousness, heart racing, and heavy breathing. Also admitted to significant weight loss. Her symptoms became so severe that she was hospitalized in 2020. She was found to be hyperthyroid at that time and was started on propanolol with up-titration of dose to 4 times a day without significant improvement in symptoms. Later that month she discovered she was pregnant. Of note, patient was noted to have Free T4 level of 7.7 on outpatient laboratory work up in 2020.    Hospital course:  Pt was given 5mg of methimazole in the office prior to arrival at the hospital. Here, pt was started on 10mg methimazole daily and 20mg propanolol Q6hr.    Pt currently reports that she feels like her heart is beating fast and that she is breathing heavy. She denies diarrhea but admits to increased frequency of defecation. Admits to feeling very hot often.      PAST MEDICAL & SURGICAL HISTORY:  Pregnancy: 15 weeks pregnant  Hashimoto's disease  Mild intermittent asthma without complication  Cyst of breast: 2 separate surgeries  left &amp; right breast  S/P cholecystectomy  S/P appy      FAMILY HISTORY: hx of thyroid disease in aunt and grandmother      Social History: Denies tobacco, alcohol, or elicit drug use.    Outpatient Medications: Home Medications: 20mg propanolol 5 times a day    MEDICATIONS  (STANDING):  calcium carbonate    500 mG (Tums) Chewable 1 Tablet(s) Chew two times a day  methimazole 20 milliGRAM(s) Oral once  prenatal multivitamin 1 Tablet(s) Oral daily  propranolol 20 milliGRAM(s) Oral four times a day    MEDICATIONS  (PRN):  ALBUTerol    90 MICROgram(s) HFA Inhaler 2 Puff(s) Inhalation every 6 hours PRN Shortness of Breath and/or Wheezing      Allergies    codeine (Pruritus)  morphine (Vomiting; Nausea)    Intolerances      Review of Systems:  Constitutional: No fever, chills   Neuro: No tremors, headache   Cardiovascular: No chest pain, +palpitations  Respiratory: +heavy breathing  GI: +nausea, denies vomiting, abdominal pain  : No dysuria, polyuria   Skin: no rash, ulcers   Psych: +anxiety   Endocrine: no polyphagia, polydipsia     ALL OTHER SYSTEMS REVIEWED AND NEGATIVE    PHYSICAL EXAM:    VITALS: T(C): 36.9 (20 @ 17:42)  T(F): 98.4 (20 @ 17:42), Max: 98.4 (20 @ 17:42)  HR: 85 (20 @ 17:42) (77 - 85)  BP: 144/55 (20 @ 17:42) (112/32 - 144/55)  RR:  (16 - 17)  SpO2:  (98% - 100%)    general: slightly anxious appearing, NAD  HEENT: +lid lag, +mild proptosis.   Thyroid: diffusely enlarged thyroid gland 40g, mildly tender  Heart: RRR, no murmurs  Lungs: CTA b/l, nonlabored respirations  Abd: gravid uterus, NT  LEs: +LE edema b/l, no calf tenderness  neuro: +hand tremor    LABS:             9.1    8.66  )-----------( 141      ( 2020 00:00 )             28.7           137  |  106  |  8   ----------------------------<  95  4.1   |  19<L>  |  0.31<L>    EGFR if : 174  EGFR if non : 150    Ca    8.7          TPro  6.1  /  Alb  3.7  /  TBili  0.7  /  DBili  x   /  AST  33<H>  /  ALT  26  /  AlkPhos  118        Thyroid Function Tests:   @ 00:00 TSH < 0.10 FreeT4 -- T3 494.4 Anti TPO 1090.0 Anti Thyroglobulin Ab -- TSI -- 35 yo  currently 18 weeks pregnant, who was sent in from OBGYN office due to finding of fetal tachycardia on routine sonogram. Pt was found to be hyperthyroid with TSH<0.1, total T4 24.86, total T3 494. Endocrine was consulted for hyperthyroidism.     Endocrine history:  Pt reports being diagnosed with Hashimoto's thyroiditis 8-10 yrs ago. She reported having symptoms of sever fatigue and weight gain at that time. Initially was not started on treatment due to supposedly normal TFTs per pt, but was later started on armour thyroid in  by OBGYN Endocrinologist. She states symptoms improved with armour thyroid. States she gave birth to first child in 2019 and armor thyroid dose was adjusted during pregnancy and post-partum with the most recent dose being 180mg. She states in February of this year  she developed symptoms of fatigue, anxiousness, heart racing, and heavy breathing. Also admitted to significant weight loss. Her symptoms became so severe that she was hospitalized in 2020. She was found to be hyperthyroid at that time. [place] thyroid was stopped and she was started on propanolol with up-titration of dose to 4 times a day without significant improvement in symptoms. Later that month she discovered she was pregnant. Of note, patient was noted to have Free T4 level of 7.7 on outpatient laboratory work up in 2020.    Hospital course:  Pt was given 5mg of methimazole in the office prior to arrival at the hospital. Here, pt was started on 10mg methimazole daily and 20mg propanolol Q6hr.    Pt currently reports that she feels like her heart is beating fast and that she is breathing heavy. She denies diarrhea but admits to increased frequency of defecation. Admits to feeling very hot often.      PAST MEDICAL & SURGICAL HISTORY:  Pregnancy: 15 weeks pregnant  Hashimoto's disease  Mild intermittent asthma without complication  Cyst of breast: 2 separate surgeries  left &amp; right breast  S/P cholecystectomy  S/P appy      FAMILY HISTORY: hx of thyroid disease in aunt and grandmother      Social History: Denies tobacco, alcohol, or illicit drug use.    Outpatient Medications: Home Medications: 20mg propanolol 5 times a day    MEDICATIONS  (STANDING):  calcium carbonate    500 mG (Tums) Chewable 1 Tablet(s) Chew two times a day  methimazole 20 milliGRAM(s) Oral once  prenatal multivitamin 1 Tablet(s) Oral daily  propranolol 20 milliGRAM(s) Oral four times a day    MEDICATIONS  (PRN):  ALBUTerol    90 MICROgram(s) HFA Inhaler 2 Puff(s) Inhalation every 6 hours PRN Shortness of Breath and/or Wheezing      Allergies    codeine (Pruritus)  morphine (Vomiting; Nausea)    Intolerances      Review of Systems:  Constitutional: No fever, chills   Neuro: No tremors, headache   Cardiovascular: No chest pain, +palpitations  Respiratory: +heavy breathing  GI: +nausea, denies vomiting, abdominal pain  : No dysuria, polyuria   Skin: no rash, ulcers   Psych: +anxiety   Endocrine: no polyphagia, polydipsia     ALL OTHER SYSTEMS REVIEWED AND NEGATIVE    PHYSICAL EXAM:    VITALS: T(C): 36.9 (20 @ 17:42)  T(F): 98.4 (20 @ 17:42), Max: 98.4 (20 @ 17:42)  HR: 85 (20 @ 17:42) (77 - 85)  BP: 144/55 (20 @ 17:42) (112/32 - 144/55)  RR:  (16 - 17)  SpO2:  (98% - 100%)    general: slightly anxious appearing, NAD  HEENT: +lid lag, +mild proptosis.   Thyroid: diffusely enlarged thyroid gland 40g, mildly tender  Heart: RRR, no murmurs  Lungs: CTA b/l, nonlabored respirations  Abd: gravid uterus, NT  LEs: +LE edema b/l, no calf tenderness  neuro: +hand tremor    LABS:             9.1    8.66  )-----------( 141      ( 2020 00:00 )             28.7           137  |  106  |  8   ----------------------------<  95  4.1   |  19<L>  |  0.31<L>    EGFR if : 174  EGFR if non : 150    Ca    8.7          TPro  6.1  /  Alb  3.7  /  TBili  0.7  /  DBili  x   /  AST  33<H>  /  ALT  26  /  AlkPhos  118        Thyroid Function Tests:   @ 00:00 TSH < 0.10 FreeT4 -- T3 494.4 Anti TPO 1090.0 Anti Thyroglobulin Ab -- TSI --

## 2020-07-02 NOTE — CHART NOTE - NSCHARTNOTEFT_GEN_A_CORE
Endocrine consult requested for uncontrolled hyperthyroidism in pregnancy.   Patient is a 34 year old female, currently 18 weeks pregnant. Was diagnosed with hyperthyroidism in March 2020, only started on propanolol. Now coming in with fetal tachycardia. Received methimazole 5 mg in OB office yesterday and currently on propanolol 20 mg 4 times a day. HR for mother in 70s currently. Peds cardio has been consulted for fetal tachycardia. Pending TFTs here.    Follow TFTs and dose methimazole accordingly  Continue propanolol to maintain goal HR 70s-80s.    Official endocrine consult to follow.

## 2020-07-02 NOTE — CONSULT NOTE ADULT - ASSESSMENT
33 yo  currently 18 weeks pregnant, sent in from OBGYN office due to finding of fetal tachycardia on routine sonogram, found to be hyperthyroid with TSH<0.1, total T4 24.86, total T3 494. Endocrine consulted for hyperthyroidism. Suspect hyperthyroidism second to Grave's disease given history of autoimmune hashimoto's thyroiditis, physical exam findings of lid lag, proptosis, and hand tremor consistent with Grave's disease, and markedly elevated total T4 and TPO ab. Differential dx also includes toxic nodule and multinodular goiter. 33 yo  currently 18 weeks pregnant, sent in from OBGYN office due to finding of fetal tachycardia on routine sonogram, found to be hyperthyroid with TSH<0.1, total T4 24.86, total T3 494. Endocrine consulted for hyperthyroidism. Suspect hyperthyroidism second to Grave's disease given history of autoimmune hashimoto's thyroiditis, physical exam finding of proptosis consistent with Grave's disease, and markedly elevated total T4 and TPO ab. Differential dx also includes toxic nodule and multinodular goiter.

## 2020-07-02 NOTE — CONSULT NOTE ADULT - ATTENDING COMMENTS
34F with history of hashimoto's hypothyroidism in the past, previously treated with [place] thyroid up until 5 months ago then found with hyperthyroidism but not given clear diagnosis now sent it at 18 weeks gestation for fetal tachycardia.  Patient's clinical and biochemical features are highly suggestive of Graves' disease.  Control maternal hyperthyroidism with higher dose methimazole. Received 10mg today, give additional 20mg and proceed with methimazole 30mg daily for now. Propranolol doses are being held please adjust hold parameters.   Patient has goiter with nodular component - check thyroid ultrasound. Follow up Graves' specific antibodies. TPO very high suggestive of autoimmune process. Check Free T4 baseline and then repeat Free T4 and total T3 in AM.  Patient will need to follow with endocrine (not reproductive endocrine as before) after discharge.    Tani Zacarias MD  Division of Endocrinology  Pager: [phone]    If after 6PM or before 9AM, or on weekends/holidays, please call endocrine answering service for assistance ([phone]).  For nonurgent matters email [email or web address] for assistance.

## 2020-07-03 ENCOUNTER — TRANSCRIPTION ENCOUNTER (OUTPATIENT)
Age: 35
End: 2020-07-03

## 2020-07-03 LAB
T3 SERPL-MCNC: 374.3 NG/DL — HIGH (ref 80–200)
T4 AB SER-ACNC: 24.86 UG/DL — HIGH (ref 5.1–13)
T4 FREE SERPL-MCNC: 7.6 NG/DL — HIGH (ref 0.9–1.8)

## 2020-07-03 PROCEDURE — 99232 SBSQ HOSP IP/OBS MODERATE 35: CPT

## 2020-07-03 PROCEDURE — 99233 SBSQ HOSP IP/OBS HIGH 50: CPT

## 2020-07-03 RX ADMIN — Medication 1 TABLET(S): at 10:02

## 2020-07-03 NOTE — PROGRESS NOTE ADULT - SUBJECTIVE AND OBJECTIVE BOX
R3 Antepartum Note, HD#3    Interval events:    Patient seen and examined at bedside, pt with itchiness last night, resolved with benadryl. No acute complaints. Pt reports +FM, denies LOF, VB, ctx, HA, epigastric pain, blurred vision, CP, SOB, N/V, fevers, and chills. Denies palpitations.     Vital Signs Last 24 Hours  T(C): 36.6 (07-03-20 @ 05:30), Max: 36.9 (07-02-20 @ 17:42)  HR: 80 (07-03-20 @ 05:30) (79 - 92)  BP: 115/54 (07-03-20 @ 05:30) (105/42 - 144/55)  RR: 18 (07-03-20 @ 05:30) (16 - 19)  SpO2: 98% (07-03-20 @ 05:30) (98% - 100%)    CAPILLARY BLOOD GLUCOSE          Physical Exam:  General: NAD  Abdomen: Soft, non-tender, gravid  Ext: No pain or swelling        Labs:             9.1    8.66  )-----------( 141      ( 07-02 @ 00:00 )             28.7     07-02 @ 00:00    137  |  106  |  8   ----------------------------<  95  4.1   |  19  |  0.31    Ca    8.7      07-02 @ 00:00    TPro  6.1  /  Alb  3.7  /  TBili  0.7  /  DBili  x   /  AST  33  /  ALT  26  /  AlkPhos  118  07-02 @ 00:00            MEDICATIONS  (STANDING):  calcium carbonate    500 mG (Tums) Chewable 1 Tablet(s) Chew two times a day  methimazole 20 milliGRAM(s) Oral once  methimazole 30 milliGRAM(s) Oral daily  prenatal multivitamin 1 Tablet(s) Oral daily  propranolol 10 milliGRAM(s) Oral every 8 hours    MEDICATIONS  (PRN):  ALBUTerol    90 MICROgram(s) HFA Inhaler 2 Puff(s) Inhalation every 6 hours PRN Shortness of Breath and/or Wheezing

## 2020-07-03 NOTE — PROGRESS NOTE ADULT - PROBLEM SELECTOR PLAN 1
-patient seen by endocrinologist team yesterday and started on methimazole.  On propanolol as well for heart rate control  -spent a significant amount of time speaking with the patient to inform her of high risk status in the setting of clinical/chemical hyperthyroidism and pregnancy  -on high dose methimazole.  Risks of agranulocytosis, rash and elevation in liver enzymes, cholestasis were discussed with the patient  -educated that if she develops a fever or sore throat, jaundice or rash that she must let her outpatient doctor know  -has no rash at this time and pruritis at present has resolved  -also discussed that fetus ultrasound will be necessary  -HR goal of 70-80

## 2020-07-03 NOTE — DISCHARGE NOTE ANTEPARTUM - PLAN OF CARE
Maternal and fetal well being Continue methimazole and propranolol. Follow up with endocrinology as scheduled.

## 2020-07-03 NOTE — DISCHARGE NOTE ANTEPARTUM - HOSPITAL COURSE
28  admitted at 18w2d with hyperthyroidism and fetal tachycardia to 200s. ATU Sono (): Thickened globular appearing placenta with vessel visualized. Fetal tachycardia noted. Amniotic fluid appears decreased. Echogenic bowel visualized on today's sonogram CL 2.45cm. EFW 178g (3%). Endocrinology consulted, recommended increased methimazole to 30mg daily and continued on propranolol.

## 2020-07-03 NOTE — DISCHARGE NOTE ANTEPARTUM - CALL YOUR HEALTHCARE PROVIDER IF YOU ARE EXPERIENCING SYMPTOMS OF DEPRESSION THAT LAST MORE THAN THREE DAYS
Statement Selected Double Island Pedicle Flap Text: The defect edges were debeveled with a #15 scalpel blade.  Given the location of the defect, shape of the defect and the proximity to free margins a double island pedicle advancement flap was deemed most appropriate.  Using a sterile surgical marker, an appropriate advancement flap was drawn incorporating the defect, outlining the appropriate donor tissue and placing the expected incisions within the relaxed skin tension lines where possible.    The area thus outlined was incised deep to adipose tissue with a #15 scalpel blade.  The skin margins were undermined to an appropriate distance in all directions around the primary defect and laterally outward around the island pedicle utilizing iris scissors.  There was minimal undermining beneath the pedicle flap.

## 2020-07-03 NOTE — PROGRESS NOTE ADULT - PROBLEM SELECTOR PLAN 1
-T4: 25  TSH: < 0.1, TPO antibodies 1090  -Endo following: recommend methimazole 30 daily, thyroid ultrasound, repeat TFTs  -Propranolol appropriate for symptomatic control  -Fetal ECHO performed, awaiting results and input from peds cardiology  -Previable, fetal monitoring not required        jasmin pgy3

## 2020-07-03 NOTE — PROGRESS NOTE ADULT - SUBJECTIVE AND OBJECTIVE BOX
Patient had some itchiness last no, no visible rash and team provided Benadryl.  The itchiness relieved at present. Endocrine following for hyperthyroidism    MEDICATIONS  (STANDING):  calcium carbonate    500 mG (Tums) Chewable 1 Tablet(s) Chew two times a day  methimazole 20 milliGRAM(s) Oral once  methimazole 30 milliGRAM(s) Oral daily  prenatal multivitamin 1 Tablet(s) Oral daily  propranolol 10 milliGRAM(s) Oral every 8 hours    MEDICATIONS  (PRN):  ALBUTerol    90 MICROgram(s) HFA Inhaler 2 Puff(s) Inhalation every 6 hours PRN Shortness of Breath and/or Wheezing      Allergies  codeine (Pruritus)  morphine (Vomiting; Nausea)    Review of Systems:  Constitutional: No fever  Eyes: No blurry vision  Neuro: + tremors  HEENT: No pain  Cardiovascular: No chest pain, palpitations  Respiratory: dyspnea on exertion at times, no SOB, no cough  GI: No nausea, vomiting, abdominal pain  Skin: no rash  Psych: no depression  ALL OTHER SYSTEMS REVIEWED AND NEGATIVE    PHYSICAL EXAM:  VITALS: T(C): 36.8 (07-03-20 @ 09:55)  T(F): 98.2 (07-03-20 @ 09:55), Max: 98.4 (07-02-20 @ 17:42)  HR: 75 (07-03-20 @ 09:55) (75 - 92)  BP: 115/50 (07-03-20 @ 09:55) (105/42 - 144/55)  RR:  (17 - 19)  SpO2:  (97% - 100%)  Wt(kg): --  GENERAL: NAD, well-groomed, well-developed  EYES: + proptosis, no lid lag, anicteric  HEENT:  Atraumatic, Normocephalic, moist mucous membranes  THYROID: Enlarged thyroid  RESPIRATORY: respiratory effort unlabored, no use of accessory muscles  CARDIOVASCULAR: Regular rate and rhythm  GI: Gravid  SKIN: Dry, intact, No rashes or lesions  MUSCULOSKELETAL: moves all extremities  NEURO: +tremors  PSYCH: Alert and oriented x 3, reactive affect, normal mood      07-02    137  |  106  |  8   ----------------------------<  95  4.1   |  19<L>  |  0.31<L>    EGFR if : 174  EGFR if non : 150    Ca    8.7      07-02    TPro  6.1  /  Alb  3.7  /  TBili  0.7  /  DBili  x   /  AST  33<H>  /  ALT  26  /  AlkPhos  118  07-02      Thyroid Function Tests:  07-03 @ 06:00 TSH -- FreeT4 7.60 T3 374.3 Anti TPO -- Anti Thyroglobulin Ab -- TSI --  07-02 @ 15:36 TSH -- FreeT4 7.77 T3 -- Anti TPO -- Anti Thyroglobulin Ab -- TSI --

## 2020-07-03 NOTE — DISCHARGE NOTE ANTEPARTUM - CARE PLAN
Principal Discharge DX:	Hyperthyroidism  Goal:	Maternal and fetal well being  Assessment and plan of treatment:	Continue methimazole and propranolol. Follow up with endocrinology as scheduled.

## 2020-07-03 NOTE — DISCHARGE NOTE ANTEPARTUM - PATIENT PORTAL LINK FT
You can access the FollowMyHealth Patient Portal offered by [facility] by registering at the following website: [email or web address]. By joining [employer]’s FollowMyHealth portal, you will also be able to view your health information using other applications (apps) compatible with our system.

## 2020-07-04 PROCEDURE — 76815 OB US LIMITED FETUS(S): CPT | Mod: 26

## 2020-07-04 PROCEDURE — 99233 SBSQ HOSP IP/OBS HIGH 50: CPT

## 2020-07-04 RX ADMIN — Medication 1 TABLET(S): at 22:00

## 2020-07-04 RX ADMIN — Medication 1 TABLET(S): at 12:49

## 2020-07-04 NOTE — PROGRESS NOTE ADULT - SUBJECTIVE AND OBJECTIVE BOX
R3 OB Antepartum noted    Patient seen and examined at bedside, no acute overnight events. No acute complaints, pain well controlled. Patient is ambulating, passing flatus, voiding spontaneously, and tolerating regular diet. Denies CP, SOB, N/V, fevers, and chills.    Vital Signs Last 24 Hours  T(C): 36.6 (07-04-20 @ 06:29), Max: 36.8 (07-03-20 @ 09:55)  HR: 69 (07-04-20 @ 06:29) (69 - 82)  BP: 132/50 (07-04-20 @ 06:29) (102/45 - 133/50)  RR: 19 (07-04-20 @ 06:29) (16 - 19)  SpO2: 98% (07-04-20 @ 06:29) (92% - 100%)    I&O's Detail      Physical Exam:  General: NAD  CV: NR, RR, S1, S2, no M/R/G  Lungs: CTA-B  Abdomen: Soft, non-tender, non-distended, +BS  Ext: No pain or swelling    Labs:             9.1<L>  8.66  )-----------( 141<L>    ( 07-02 @ 00:00 )             28.7<L>    MEDICATIONS  (STANDING):  calcium carbonate    500 mG (Tums) Chewable 1 Tablet(s) Chew two times a day  methimazole 20 milliGRAM(s) Oral once  methimazole 30 milliGRAM(s) Oral daily  prenatal multivitamin 1 Tablet(s) Oral daily  propranolol 10 milliGRAM(s) Oral every 8 hours    MEDICATIONS  (PRN):  ALBUTerol    90 MICROgram(s) HFA Inhaler 2 Puff(s) Inhalation every 6 hours PRN Shortness of Breath and/or Wheezing

## 2020-07-04 NOTE — PROGRESS NOTE ADULT - PROBLEM SELECTOR PLAN 1
-T4: 25  TSH: < 0.1, TPO antibodies 1090  -Endo following: recommend methimazole 30 daily, thyroid ultrasound, repeat TFTs  -Propranolol appropriate for symptomatic control  -Fetal ECHO performed, awaiting results and input from peds cardiology  -Previable, fetal monitoring not required    LESLIE Lomeli PGY-3

## 2020-07-05 DIAGNOSIS — O21.9 VOMITING OF PREGNANCY, UNSPECIFIED: ICD-10-CM

## 2020-07-05 LAB
ALBUMIN SERPL ELPH-MCNC: 3.3 G/DL — SIGNIFICANT CHANGE UP (ref 3.3–5)
ALP SERPL-CCNC: 111 U/L — SIGNIFICANT CHANGE UP (ref 40–120)
ALT FLD-CCNC: 25 U/L — SIGNIFICANT CHANGE UP (ref 4–33)
AMNISURE ROM (RUPTURE OF MEMBRANES): POSITIVE — SIGNIFICANT CHANGE UP
ANION GAP SERPL CALC-SCNC: 10 MMO/L — SIGNIFICANT CHANGE UP (ref 7–14)
APTT BLD: 29.2 SEC — SIGNIFICANT CHANGE UP (ref 27–36.3)
AST SERPL-CCNC: 32 U/L — SIGNIFICANT CHANGE UP (ref 4–32)
BASOPHILS # BLD AUTO: 0.01 K/UL — SIGNIFICANT CHANGE UP (ref 0–0.2)
BASOPHILS NFR BLD AUTO: 0.1 % — SIGNIFICANT CHANGE UP (ref 0–2)
BILIRUB SERPL-MCNC: 0.7 MG/DL — SIGNIFICANT CHANGE UP (ref 0.2–1.2)
BLD GP AB SCN SERPL QL: NEGATIVE — SIGNIFICANT CHANGE UP
BUN SERPL-MCNC: 8 MG/DL — SIGNIFICANT CHANGE UP (ref 7–23)
CALCIUM SERPL-MCNC: 9.2 MG/DL — SIGNIFICANT CHANGE UP (ref 8.4–10.5)
CHLORIDE SERPL-SCNC: 104 MMOL/L — SIGNIFICANT CHANGE UP (ref 98–107)
CO2 SERPL-SCNC: 22 MMOL/L — SIGNIFICANT CHANGE UP (ref 22–31)
CREAT SERPL-MCNC: 0.46 MG/DL — LOW (ref 0.5–1.3)
EOSINOPHIL # BLD AUTO: 0.08 K/UL — SIGNIFICANT CHANGE UP (ref 0–0.5)
EOSINOPHIL NFR BLD AUTO: 1 % — SIGNIFICANT CHANGE UP (ref 0–6)
FIBRINOGEN PPP-MCNC: 497 MG/DL — SIGNIFICANT CHANGE UP (ref 300–520)
GLUCOSE SERPL-MCNC: 112 MG/DL — HIGH (ref 70–99)
HCT VFR BLD CALC: 28.1 % — LOW (ref 34.5–45)
HGB BLD-MCNC: 9.4 G/DL — LOW (ref 11.5–15.5)
IMM GRANULOCYTES NFR BLD AUTO: 0.3 % — SIGNIFICANT CHANGE UP (ref 0–1.5)
INR BLD: 1.16 — SIGNIFICANT CHANGE UP (ref 0.88–1.17)
LACTATE SERPL-SCNC: 1.4 MMOL/L — SIGNIFICANT CHANGE UP (ref 0.5–2)
LYMPHOCYTES # BLD AUTO: 1.78 K/UL — SIGNIFICANT CHANGE UP (ref 1–3.3)
LYMPHOCYTES # BLD AUTO: 22.3 % — SIGNIFICANT CHANGE UP (ref 13–44)
MCHC RBC-ENTMCNC: 28.9 PG — SIGNIFICANT CHANGE UP (ref 27–34)
MCHC RBC-ENTMCNC: 33.5 % — SIGNIFICANT CHANGE UP (ref 32–36)
MCV RBC AUTO: 86.5 FL — SIGNIFICANT CHANGE UP (ref 80–100)
MONOCYTES # BLD AUTO: 0.54 K/UL — SIGNIFICANT CHANGE UP (ref 0–0.9)
MONOCYTES NFR BLD AUTO: 6.8 % — SIGNIFICANT CHANGE UP (ref 2–14)
NEUTROPHILS # BLD AUTO: 5.57 K/UL — SIGNIFICANT CHANGE UP (ref 1.8–7.4)
NEUTROPHILS NFR BLD AUTO: 69.5 % — SIGNIFICANT CHANGE UP (ref 43–77)
NRBC # FLD: 0 K/UL — SIGNIFICANT CHANGE UP (ref 0–0)
PLATELET # BLD AUTO: 135 K/UL — LOW (ref 150–400)
PMV BLD: 12.4 FL — SIGNIFICANT CHANGE UP (ref 7–13)
POTASSIUM SERPL-MCNC: 4.2 MMOL/L — SIGNIFICANT CHANGE UP (ref 3.5–5.3)
POTASSIUM SERPL-SCNC: 4.2 MMOL/L — SIGNIFICANT CHANGE UP (ref 3.5–5.3)
PROT SERPL-MCNC: 6.6 G/DL — SIGNIFICANT CHANGE UP (ref 6–8.3)
PROTHROM AB SERPL-ACNC: 13.3 SEC — HIGH (ref 9.8–13.1)
RBC # BLD: 3.25 M/UL — LOW (ref 3.8–5.2)
RBC # FLD: 16.4 % — HIGH (ref 10.3–14.5)
RH IG SCN BLD-IMP: POSITIVE — SIGNIFICANT CHANGE UP
SODIUM SERPL-SCNC: 136 MMOL/L — SIGNIFICANT CHANGE UP (ref 135–145)
T3 SERPL-MCNC: 284.8 NG/DL — HIGH (ref 80–200)
T4 AB SER-ACNC: 19.33 UG/DL — HIGH (ref 5.1–13)
T4 FREE SERPL-MCNC: 3.9 NG/DL — HIGH (ref 0.9–1.8)
TSI ACT/NOR SER: 844 IU/L — HIGH (ref 0–0.55)
WBC # BLD: 8 K/UL — SIGNIFICANT CHANGE UP (ref 3.8–10.5)
WBC # FLD AUTO: 8 K/UL — SIGNIFICANT CHANGE UP (ref 3.8–10.5)

## 2020-07-05 PROCEDURE — 76536 US EXAM OF HEAD AND NECK: CPT | Mod: 26

## 2020-07-05 PROCEDURE — 99232 SBSQ HOSP IP/OBS MODERATE 35: CPT

## 2020-07-05 RX ORDER — SODIUM CHLORIDE 9 MG/ML
1000 INJECTION, SOLUTION INTRAVENOUS
Refills: 0 | Status: DISCONTINUED | OUTPATIENT
Start: 2020-07-05 | End: 2020-07-07

## 2020-07-05 RX ORDER — FAMOTIDINE 10 MG/ML
20 INJECTION INTRAVENOUS ONCE
Refills: 0 | Status: COMPLETED | OUTPATIENT
Start: 2020-07-05 | End: 2020-07-05

## 2020-07-05 RX ORDER — GENTAMICIN SULFATE 40 MG/ML
320 VIAL (ML) INJECTION ONCE
Refills: 0 | Status: COMPLETED | OUTPATIENT
Start: 2020-07-05 | End: 2020-07-05

## 2020-07-05 RX ORDER — AMPICILLIN TRIHYDRATE 250 MG
2 CAPSULE ORAL EVERY 6 HOURS
Refills: 0 | Status: DISCONTINUED | OUTPATIENT
Start: 2020-07-06 | End: 2020-07-06

## 2020-07-05 RX ORDER — ONDANSETRON 8 MG/1
4 TABLET, FILM COATED ORAL ONCE
Refills: 0 | Status: COMPLETED | OUTPATIENT
Start: 2020-07-05 | End: 2020-07-05

## 2020-07-05 RX ORDER — ONDANSETRON 8 MG/1
4 TABLET, FILM COATED ORAL EVERY 6 HOURS
Refills: 0 | Status: DISCONTINUED | OUTPATIENT
Start: 2020-07-05 | End: 2020-07-07

## 2020-07-05 RX ORDER — AMPICILLIN TRIHYDRATE 250 MG
2 CAPSULE ORAL ONCE
Refills: 0 | Status: COMPLETED | OUTPATIENT
Start: 2020-07-05 | End: 2020-07-05

## 2020-07-05 RX ORDER — DIPHENHYDRAMINE HCL 50 MG
50 CAPSULE ORAL AT BEDTIME
Refills: 0 | Status: DISCONTINUED | OUTPATIENT
Start: 2020-07-05 | End: 2020-07-07

## 2020-07-05 RX ORDER — SODIUM CHLORIDE 9 MG/ML
3 INJECTION INTRAMUSCULAR; INTRAVENOUS; SUBCUTANEOUS EVERY 8 HOURS
Refills: 0 | Status: DISCONTINUED | OUTPATIENT
Start: 2020-07-05 | End: 2020-07-06

## 2020-07-05 RX ORDER — PYRIDOXINE HCL (VITAMIN B6) 100 MG
50 TABLET ORAL EVERY 12 HOURS
Refills: 0 | Status: DISCONTINUED | OUTPATIENT
Start: 2020-07-05 | End: 2020-07-07

## 2020-07-05 RX ORDER — AMPICILLIN TRIHYDRATE 250 MG
CAPSULE ORAL
Refills: 0 | Status: DISCONTINUED | OUTPATIENT
Start: 2020-07-05 | End: 2020-07-06

## 2020-07-05 RX ADMIN — ONDANSETRON 4 MILLIGRAM(S): 8 TABLET, FILM COATED ORAL at 01:52

## 2020-07-05 RX ADMIN — Medication 216 GRAM(S): at 21:15

## 2020-07-05 RX ADMIN — Medication 1 TABLET(S): at 06:25

## 2020-07-05 RX ADMIN — Medication 500 MILLIGRAM(S): at 22:06

## 2020-07-05 RX ADMIN — Medication 1 TABLET(S): at 12:25

## 2020-07-05 RX ADMIN — FAMOTIDINE 20 MILLIGRAM(S): 10 INJECTION INTRAVENOUS at 01:52

## 2020-07-05 NOTE — CHART NOTE - NSCHARTNOTEFT_GEN_A_CORE
Pt seen and evaluated at bedside. Pt reports leakage of fluid, noted a trickle of fluid down her leg that filled 1/2 a pad. Pt reports this felt different from urinating.     Vital Signs Last 24 Hrs  T(C): 36.7 (2020 14:07), Max: 36.8 (2020 18:00)  T(F): 98.1 (2020 14:07), Max: 98.2 (2020 18:00)  HR: 70 (2020 14:07) (70 - 80)  BP: 105/45 (2020 14:07) (103/44 - 140/42)  RR: 16 (2020 14:07) (16 - 18)  SpO2: 100% (2020 14:07) (97% - 100%)    Exam:  GA: NAD  Abd: Nontender to palpation  SSE: green/yellow viscous discharge noted, no pooling, +nitrazine, -ferning  VE: c/l/p      A&P: 35 y/o  at 18w4d admitted with hyperthyroidism and fetal tachycardia now with leakage of fluid. Viscous discharge noted on speculum exam, Nitrazine positive, ferning negative  -Amnisure sent       D/w Dr. Casper, MFM Fellow  jasmin pgy3 Pt seen and evaluated at bedside. Pt reports leakage of fluid, noted a trickle of fluid down her leg that filled 1/2 a pad. Pt reports this felt different from urinating.     Vital Signs Last 24 Hrs  T(C): 36.7 (2020 14:07), Max: 36.8 (2020 18:00)  T(F): 98.1 (2020 14:07), Max: 98.2 (2020 18:00)  HR: 70 (2020 14:07) (70 - 80)  BP: 105/45 (2020 14:07) (103/44 - 140/42)  RR: 16 (2020 14:07) (16 - 18)  SpO2: 100% (2020 14:07) (97% - 100%)    Exam:  GA: NAD  Abd: Nontender to palpation  SSE: green/yellow viscous discharge noted, no pooling, +nitrazine, -ferning  VE: c/l/p      A&P: 33 y/o  at 18w4d admitted with hyperthyroidism and fetal tachycardia now with leakage of fluid. Viscous discharge noted on speculum exam, Nitrazine positive, ferning negative  -Amnisure sent       D/w Dr. Casper, MFM Fellow  jasmin pgy3          Addendum:  Amnisure positive  Clinical picture unclear at this time- Pooling and ferning negative, nitrazine and amnisure positive  Pt no longer feeling LOF  Given this is a highly desired pregnancy, will manage expectantly for now  Repeat SSE in AM  Official ATU sono in the AM to evaluate fluid   Close VS monitoring overnight  Signs/symptoms of infection reviewed with pt and partner at bedside    Plan per Dr. Casper and Dr. Lake castellanos3 Pt seen and evaluated at bedside. Pt reports leakage of fluid, noted a trickle of fluid down her leg that filled 1/2 a pad. Pt reports this felt different from urinating.     Vital Signs Last 24 Hrs  T(C): 36.7 (2020 14:07), Max: 36.8 (2020 18:00)  T(F): 98.1 (2020 14:07), Max: 98.2 (2020 18:00)  HR: 70 (2020 14:07) (70 - 80)  BP: 105/45 (2020 14:07) (103/44 - 140/42)  RR: 16 (2020 14:07) (16 - 18)  SpO2: 100% (2020 14:07) (97% - 100%)    Exam:  GA: NAD  Abd: Nontender to palpation  SSE: green/yellow viscous discharge noted, no pooling, +nitrazine, -ferning  VE: c/l/p      A&P: 33 y/o  at 18w4d admitted with hyperthyroidism and fetal tachycardia now with leakage of fluid. Viscous discharge noted on speculum exam, Nitrazine positive, ferning negative  -Amnisure sent       D/w Dr. Casper, M Fellow  jasmin pgy3          Addendum:  Amnisure positive  Clinical picture unclear at this time- Pooling and ferning negative, nitrazine and amnisure positive  Pt no longer feeling LOF  Given this is a highly desired pregnancy, will manage expectantly for now  Repeat SSE in AM  Official ATU sono in the AM to evaluate fluid   Close VS monitoring overnight  Signs/symptoms of infection reviewed with pt and partner at bedside    Plan per Dr. Casper and Dr. Lake castellanos3      MFM Fellow Note     Notified by Dr. Arroyo on above complaint and findings. At this time there is low suspicion for ruptured membranes, as pooling, valsalva and ferning are negative. Continue expectant management. Will reeevaluae patient tomorrow with a sonogram and repeat exam to determine if patient is ruptured. Will continue to monitor for sings and symptoms infection.     Discussed with Dr. Lake Casper MD   M Fellow

## 2020-07-05 NOTE — PROGRESS NOTE ADULT - PROBLEM SELECTOR PLAN 1
-T4: 25  TSH: < 0.1, TPO antibodies 1090  -Repeat TFTs this AM  -Appreciate Endo recs--c/w methimazole 30 daily; thyroid ultrasound ordered, pending  -Propranolol appropriate for symptomatic control

## 2020-07-05 NOTE — PROGRESS NOTE ADULT - SUBJECTIVE AND OBJECTIVE BOX
R3 Antepartum Note, HD#5    Interval events: patient reported nausea, one episode of emesis overnight. Now asymptomatic.    Patient seen and examined at bedside, no acute overnight events. No acute complaints. Pt reports +FM, denies LOF, VB, ctx, HA, epigastric pain, blurred vision, CP, SOB, N/V, fevers, and chills.    Vital Signs Last 24 Hours  T(C): 36.2 (07-05-20 @ 01:35), Max: 36.8 (07-04-20 @ 18:00)  HR: 80 (07-05-20 @ 01:35) (69 - 80)  BP: 121/49 (07-05-20 @ 01:35) (110/72 - 140/42)  RR: 18 (07-05-20 @ 01:35) (17 - 19)  SpO2: 100% (07-05-20 @ 01:35) (98% - 100%)    CAPILLARY BLOOD GLUCOSE          Physical Exam:  General: NAD  Abdomen: Soft, non-tender, gravid  Ext: No pain or swelling      Labs:                MEDICATIONS  (STANDING):  calcium carbonate    500 mG (Tums) Chewable 1 Tablet(s) Chew two times a day  methimazole 30 milliGRAM(s) Oral daily  prenatal multivitamin 1 Tablet(s) Oral daily  propranolol 10 milliGRAM(s) Oral every 8 hours    MEDICATIONS  (PRN):  ALBUTerol    90 MICROgram(s) HFA Inhaler 2 Puff(s) Inhalation every 6 hours PRN Shortness of Breath and/or Wheezing R3 Antepartum Note, HD#5    Interval events: patient reported nausea, one episode of emesis overnight. Now asymptomatic.    Patient seen and examined at bedside, no acute overnight events. No acute complaints. Pt reports +FM, denies LOF, VB, ctx, HA, epigastric pain, blurred vision, CP, SOB, N/V, fevers, and chills.    Vital Signs Last 24 Hours  T(C): 36.2 (07-05-20 @ 01:35), Max: 36.8 (07-04-20 @ 18:00)  HR: 80 (07-05-20 @ 01:35) (69 - 80)  BP: 121/49 (07-05-20 @ 01:35) (110/72 - 140/42)  RR: 18 (07-05-20 @ 01:35) (17 - 19)  SpO2: 100% (07-05-20 @ 01:35) (98% - 100%)    CAPILLARY BLOOD GLUCOSE            Physical Exam:  General: NAD  Abdomen: Soft, non-tender, gravid  Ext: No pain or swelling      Labs:                MEDICATIONS  (STANDING):  calcium carbonate    500 mG (Tums) Chewable 1 Tablet(s) Chew two times a day  methimazole 30 milliGRAM(s) Oral daily  prenatal multivitamin 1 Tablet(s) Oral daily  propranolol 10 milliGRAM(s) Oral every 8 hours    MEDICATIONS  (PRN):  ALBUTerol    90 MICROgram(s) HFA Inhaler 2 Puff(s) Inhalation every 6 hours PRN Shortness of Breath and/or Wheezing

## 2020-07-05 NOTE — PROGRESS NOTE ADULT - PROBLEM SELECTOR PLAN 3
- consistent with baseline nausea and vomiting, not improved with TUMs at home  - c/w benadryl, pyridoxine, and zofran PRN for symptomatic relief    Hattie Bedoya PGY3

## 2020-07-05 NOTE — CHART NOTE - NSCHARTNOTEFT_GEN_A_CORE
R4 OB Chart Note    Pt was discussed with Dr. Bethea during safety rounds.  Dr. Bethea agrees with IOL for previable PPROM and tx for presumed chorio with A/G.  Additionally, Dr. Bethea advised pt be counselled to allow autopsy/cytogenetics due to multiple fetal anomalies seen on [facility] sono.    Pt seen and evaluated at bedside w/ Dr. Chacon.  Pt consented for IOL with vaginal cytotec, possible D&C in the case of retained placenta, and pt will accept blood products if needed.    Stat labs resulted as below:                        9.4    8.00  )-----------( 135      ( 2020 20:00 )             28.1   PT/INR - ( 2020 20:00 )   PT: 13.3 SEC;   INR: 1.16          PTT - ( 2020 20:00 )  PTT:29.2 SEC  Fibrinogen Assay (20 @ 20:00)    Fibrinogen Assay: 497.0 mg/dL    Comprehensive Metabolic Panel (20 @ 20:00)    Sodium, Serum: 136 mmol/L    Potassium, Serum: 4.2 mmol/L    Chloride, Serum: 104 mmol/L    Carbon Dioxide, Serum: 22 mmol/L    Anion Gap, Serum: 10 mmo/L    Blood Urea Nitrogen, Serum: 8 mg/dL    Creatinine, Serum: 0.46 mg/dL    Glucose, Serum: 112 mg/dL    Calcium, Total Serum: 9.2 mg/dL    Protein Total, Serum: 6.6 g/dL    Albumin, Serum: 3.3 g/dL    Bilirubin Total, Serum: 0.7 mg/dL    Alkaline Phosphatase, Serum: 111 u/L    Aspartate Aminotransferase (AST/SGOT): 32 u/L    Alanine Aminotransferase (ALT/SGPT): 25 u/L    eGFR if Non : 132    eGFR if : 153 mL/min    Lactate, Blood (20 @ 20:00)    Lactate, Blood: 1.4 mmol/L    A/P: 34y  at 18w4d a/w hyperthyroidism and fetal tachycardia, now grossly ruptured on exam now with fundal tenderness.  - clears, LR@125  - Amp/Gent for chorio  - monitor temps/VS  - Start Vag Cytotec 400mcg buccal + 400mcg vaginal x1 then 400mcg q3h for max of 2400mcg/24h  - pt consented as above, all questions answered    d/w Dr. Ines Plunkett MD PGY4 R4 OB Chart Note    Pt was discussed with Dr. Bethea during safety rounds.  Dr. Bethea agrees with IOL for previable PPROM and tx for presumed chorio with A/G.  Additionally, Dr. Bethea advised pt be counselled to allow autopsy/cytogenetics due to multiple fetal anomalies seen on [facility] sono.    Pt seen and evaluated at bedside w/ Dr. Chacon.  Pt consented for IOL with vaginal cytotec, possible D&C in the case of retained placenta, and pt will accept blood products if needed.    Stat labs resulted as below:                        9.4    8.00  )-----------( 135      ( 2020 20:00 )             28.1   PT/INR - ( 2020 20:00 )   PT: 13.3 SEC;   INR: 1.16          PTT - ( 2020 20:00 )  PTT:29.2 SEC  Fibrinogen Assay (20 @ 20:00)    Fibrinogen Assay: 497.0 mg/dL    Comprehensive Metabolic Panel (20 @ 20:00)    Sodium, Serum: 136 mmol/L    Potassium, Serum: 4.2 mmol/L    Chloride, Serum: 104 mmol/L    Carbon Dioxide, Serum: 22 mmol/L    Anion Gap, Serum: 10 mmo/L    Blood Urea Nitrogen, Serum: 8 mg/dL    Creatinine, Serum: 0.46 mg/dL    Glucose, Serum: 112 mg/dL    Calcium, Total Serum: 9.2 mg/dL    Protein Total, Serum: 6.6 g/dL    Albumin, Serum: 3.3 g/dL    Bilirubin Total, Serum: 0.7 mg/dL    Alkaline Phosphatase, Serum: 111 u/L    Aspartate Aminotransferase (AST/SGOT): 32 u/L    Alanine Aminotransferase (ALT/SGPT): 25 u/L    eGFR if Non : 132    eGFR if : 153 mL/min    Lactate, Blood (20 @ 20:00)    Lactate, Blood: 1.4 mmol/L    A/P: 34y  at 18w4d a/w hyperthyroidism and fetal tachycardia, now grossly ruptured on exam now with fundal tenderness.  - clears, LR@125  - Amp/Gent for chorio  - monitor temps/VS  - Start Vag Cytotec 400mcg buccal + 400mcg vaginal x1 then 400mcg q3h for max of 2400mcg/24h  - pt consented as above, all questions answered    d/w Dr. Ines Plunkett MD PGY4    Attending: Pt seen and examined with resident  +uterine tenderness  Agree with above plan  consents obtained as above

## 2020-07-05 NOTE — PROGRESS NOTE ADULT - SUBJECTIVE AND OBJECTIVE BOX
MFM Fellow Note      Patient seen and examined at bedside, no acute overnight events. No acute complaints. Denies vaginal bleeding, leakage of fluid or abdominal pain.     Vital Signs Last 24 Hours  T(C): 36.4 (07-05-20 @ 09:38), Max: 36.8 (07-04-20 @ 18:00)  HR: 70 (07-05-20 @ 09:38) (70 - 80)  BP: 106/45 (07-05-20 @ 09:38) (103/44 - 140/42)  RR: 16 (07-05-20 @ 09:38) (16 - 18)  SpO2: 97% (07-05-20 @ 09:38) (97% - 100%)        Exam:   Gen: NAD  Abd: non tender    Bedside sonogram performed, IUP visualized     Labs:   Free thyroxine 3.9 (decreased from 7.6  T4 19.33 (decreased from 24.86  T3 284 (decreased 374)

## 2020-07-05 NOTE — CHART NOTE - NSCHARTNOTEFT_GEN_A_CORE
Notified by Dr. Plunkett (PGY-4) that patient complained of cramping pain and leakage of fluid again. On evaluation patient was found to be grossly ruptured and now with fundal tenderness. At this time secondary to ruptured membranes and suspected chorioamnionitis, recommend proceeding with delivery via induction. Recommend to initiate ampicillin and gentamicin. Consent patient for D&C for possible retaining placenta, and send placenta to pathology. Offer patient cytogenetics as well as an autopsy. Maintain active type and screen and have 2 units of PRBC on hold. Discussed case with Dr. Bethea (MFM Attending).     Urmila Casper MD   MFM Fellow

## 2020-07-06 ENCOUNTER — RESULT REVIEW (OUTPATIENT)
Age: 35
End: 2020-07-06

## 2020-07-06 ENCOUNTER — APPOINTMENT (OUTPATIENT)
Dept: ANTEPARTUM | Facility: CLINIC | Age: 35
End: 2020-07-06

## 2020-07-06 ENCOUNTER — APPOINTMENT (OUTPATIENT)
Dept: PEDIATRIC CARDIOLOGY | Facility: CLINIC | Age: 35
End: 2020-07-06

## 2020-07-06 ENCOUNTER — TRANSCRIPTION ENCOUNTER (OUTPATIENT)
Age: 35
End: 2020-07-06

## 2020-07-06 LAB — TSH RECEP AB FLD-ACNC: 321 IU/L — HIGH (ref 0–1.75)

## 2020-07-06 PROCEDURE — 99233 SBSQ HOSP IP/OBS HIGH 50: CPT | Mod: GC

## 2020-07-06 PROCEDURE — 99232 SBSQ HOSP IP/OBS MODERATE 35: CPT | Mod: GC

## 2020-07-06 PROCEDURE — 88309 TISSUE EXAM BY PATHOLOGIST: CPT | Mod: 26

## 2020-07-06 PROCEDURE — 88305 TISSUE EXAM BY PATHOLOGIST: CPT | Mod: 26

## 2020-07-06 PROCEDURE — 59856 INDUCED AB 1+VAG SUPP D&C: CPT | Mod: GC

## 2020-07-06 RX ORDER — METHIMAZOLE 10 MG/1
2 TABLET ORAL
Qty: 60 | Refills: 0
Start: 2020-07-06 | End: 2020-08-04

## 2020-07-06 RX ORDER — DIBUCAINE 1 %
1 OINTMENT (GRAM) RECTAL EVERY 6 HOURS
Refills: 0 | Status: DISCONTINUED | OUTPATIENT
Start: 2020-07-06 | End: 2020-07-07

## 2020-07-06 RX ORDER — LANOLIN
1 OINTMENT (GRAM) TOPICAL EVERY 6 HOURS
Refills: 0 | Status: DISCONTINUED | OUTPATIENT
Start: 2020-07-06 | End: 2020-07-07

## 2020-07-06 RX ORDER — METOCLOPRAMIDE HCL 10 MG
10 TABLET ORAL ONCE
Refills: 0 | Status: COMPLETED | OUTPATIENT
Start: 2020-07-06 | End: 2020-07-06

## 2020-07-06 RX ORDER — HYDROMORPHONE HYDROCHLORIDE 2 MG/ML
1 INJECTION INTRAMUSCULAR; INTRAVENOUS; SUBCUTANEOUS ONCE
Refills: 0 | Status: DISCONTINUED | OUTPATIENT
Start: 2020-07-06 | End: 2020-07-06

## 2020-07-06 RX ORDER — SIMETHICONE 80 MG/1
80 TABLET, CHEWABLE ORAL EVERY 4 HOURS
Refills: 0 | Status: DISCONTINUED | OUTPATIENT
Start: 2020-07-06 | End: 2020-07-07

## 2020-07-06 RX ORDER — KETOROLAC TROMETHAMINE 30 MG/ML
30 SYRINGE (ML) INJECTION ONCE
Refills: 0 | Status: DISCONTINUED | OUTPATIENT
Start: 2020-07-06 | End: 2020-07-07

## 2020-07-06 RX ORDER — AER TRAVELER 0.5 G/1
1 SOLUTION RECTAL; TOPICAL EVERY 4 HOURS
Refills: 0 | Status: DISCONTINUED | OUTPATIENT
Start: 2020-07-06 | End: 2020-07-07

## 2020-07-06 RX ORDER — ATENOLOL 25 MG/1
0.5 TABLET ORAL
Qty: 15 | Refills: 0
Start: 2020-07-06 | End: 2020-08-04

## 2020-07-06 RX ORDER — IBUPROFEN 200 MG
600 TABLET ORAL EVERY 6 HOURS
Refills: 0 | Status: DISCONTINUED | OUTPATIENT
Start: 2020-07-06 | End: 2020-07-07

## 2020-07-06 RX ORDER — TETANUS TOXOID, REDUCED DIPHTHERIA TOXOID AND ACELLULAR PERTUSSIS VACCINE, ADSORBED 5; 2.5; 8; 8; 2.5 [IU]/.5ML; [IU]/.5ML; UG/.5ML; UG/.5ML; UG/.5ML
0.5 SUSPENSION INTRAMUSCULAR ONCE
Refills: 0 | Status: DISCONTINUED | OUTPATIENT
Start: 2020-07-06 | End: 2020-07-07

## 2020-07-06 RX ORDER — HYDROCORTISONE 1 %
1 OINTMENT (GRAM) TOPICAL EVERY 6 HOURS
Refills: 0 | Status: DISCONTINUED | OUTPATIENT
Start: 2020-07-06 | End: 2020-07-07

## 2020-07-06 RX ORDER — IBUPROFEN 200 MG
1 TABLET ORAL
Qty: 0 | Refills: 0 | DISCHARGE
Start: 2020-07-06

## 2020-07-06 RX ORDER — SODIUM CHLORIDE 9 MG/ML
3 INJECTION INTRAMUSCULAR; INTRAVENOUS; SUBCUTANEOUS EVERY 8 HOURS
Refills: 0 | Status: DISCONTINUED | OUTPATIENT
Start: 2020-07-06 | End: 2020-07-07

## 2020-07-06 RX ORDER — ERTAPENEM SODIUM 1 G/1
1000 INJECTION, POWDER, LYOPHILIZED, FOR SOLUTION INTRAMUSCULAR; INTRAVENOUS ONCE
Refills: 0 | Status: COMPLETED | OUTPATIENT
Start: 2020-07-06 | End: 2020-07-06

## 2020-07-06 RX ORDER — MAGNESIUM HYDROXIDE 400 MG/1
30 TABLET, CHEWABLE ORAL
Refills: 0 | Status: DISCONTINUED | OUTPATIENT
Start: 2020-07-06 | End: 2020-07-07

## 2020-07-06 RX ORDER — ACETAMINOPHEN 500 MG
975 TABLET ORAL
Refills: 0 | Status: DISCONTINUED | OUTPATIENT
Start: 2020-07-06 | End: 2020-07-07

## 2020-07-06 RX ORDER — ERTAPENEM SODIUM 1 G/1
INJECTION, POWDER, LYOPHILIZED, FOR SOLUTION INTRAMUSCULAR; INTRAVENOUS
Refills: 0 | Status: DISCONTINUED | OUTPATIENT
Start: 2020-07-06 | End: 2020-07-06

## 2020-07-06 RX ORDER — ONDANSETRON 8 MG/1
4 TABLET, FILM COATED ORAL ONCE
Refills: 0 | Status: COMPLETED | OUTPATIENT
Start: 2020-07-06 | End: 2020-07-06

## 2020-07-06 RX ORDER — DIPHENHYDRAMINE HCL 50 MG
25 CAPSULE ORAL ONCE
Refills: 0 | Status: COMPLETED | OUTPATIENT
Start: 2020-07-06 | End: 2020-07-06

## 2020-07-06 RX ORDER — DIPHENHYDRAMINE HCL 50 MG
25 CAPSULE ORAL EVERY 6 HOURS
Refills: 0 | Status: DISCONTINUED | OUTPATIENT
Start: 2020-07-06 | End: 2020-07-07

## 2020-07-06 RX ORDER — OXYTOCIN 10 UNIT/ML
333.33 VIAL (ML) INJECTION
Qty: 20 | Refills: 0 | Status: DISCONTINUED | OUTPATIENT
Start: 2020-07-06 | End: 2020-07-07

## 2020-07-06 RX ORDER — ACETAMINOPHEN 500 MG
3 TABLET ORAL
Qty: 0 | Refills: 0 | DISCHARGE
Start: 2020-07-06

## 2020-07-06 RX ORDER — BENZOCAINE 10 %
1 GEL (GRAM) MUCOUS MEMBRANE EVERY 6 HOURS
Refills: 0 | Status: DISCONTINUED | OUTPATIENT
Start: 2020-07-06 | End: 2020-07-07

## 2020-07-06 RX ORDER — PRAMOXINE HYDROCHLORIDE 150 MG/15G
1 AEROSOL, FOAM RECTAL EVERY 4 HOURS
Refills: 0 | Status: DISCONTINUED | OUTPATIENT
Start: 2020-07-06 | End: 2020-07-07

## 2020-07-06 RX ADMIN — Medication 1000 MILLIUNIT(S)/MIN: at 06:27

## 2020-07-06 RX ADMIN — ERTAPENEM SODIUM 120 MILLIGRAM(S): 1 INJECTION, POWDER, LYOPHILIZED, FOR SOLUTION INTRAMUSCULAR; INTRAVENOUS at 06:27

## 2020-07-06 RX ADMIN — Medication 25 MILLIGRAM(S): at 00:43

## 2020-07-06 RX ADMIN — SODIUM CHLORIDE 3 MILLILITER(S): 9 INJECTION INTRAMUSCULAR; INTRAVENOUS; SUBCUTANEOUS at 17:52

## 2020-07-06 RX ADMIN — ONDANSETRON 4 MILLIGRAM(S): 8 TABLET, FILM COATED ORAL at 00:46

## 2020-07-06 RX ADMIN — SODIUM CHLORIDE 125 MILLILITER(S): 9 INJECTION, SOLUTION INTRAVENOUS at 00:50

## 2020-07-06 RX ADMIN — Medication 975 MILLIGRAM(S): at 19:50

## 2020-07-06 RX ADMIN — SODIUM CHLORIDE 3 MILLILITER(S): 9 INJECTION INTRAMUSCULAR; INTRAVENOUS; SUBCUTANEOUS at 23:01

## 2020-07-06 RX ADMIN — Medication 216 GRAM(S): at 03:20

## 2020-07-06 RX ADMIN — HYDROMORPHONE HYDROCHLORIDE 1 MILLIGRAM(S): 2 INJECTION INTRAMUSCULAR; INTRAVENOUS; SUBCUTANEOUS at 00:52

## 2020-07-06 RX ADMIN — HYDROMORPHONE HYDROCHLORIDE 1 MILLIGRAM(S): 2 INJECTION INTRAMUSCULAR; INTRAVENOUS; SUBCUTANEOUS at 03:45

## 2020-07-06 RX ADMIN — Medication 10 MILLIGRAM(S): at 03:52

## 2020-07-06 NOTE — PROGRESS NOTE ADULT - PROBLEM SELECTOR PLAN 1
- free T4 coming down nicely and pt clinically improving on methimazole 30mg daily  Discharge recommendations:  - recommend discharging pt on methimazole 20mg once a day (two 10mg tabs to be taken at the same time)   - recommend discharging pt on atenolol 12.5mg (1/2 tab of 25mg) once a day given persistent mild tremor  - educated pt that if she develops a fever or sore throat, jaundice, or rash that she must let her outpatient primary care doctor or endocrinologist know  - recommend f/u with outpatient Endocrinologist within 2 weeks of discharge for clinical and chemical monitoring. Will be following up with Dr. Nataly Olivarez at [facility]  - Pt should consider radioactive iodine therapy outpatient or long term treatment for hyperthyroidism. To be discussed further with outpatient Endo. - free T4 coming down nicely and pt clinically improving on methimazole 30mg daily  Discharge recommendations:  - recommend discharging pt on methimazole 20mg once a day (two 10mg tabs to be taken at the same time)   - recommend discharging pt on atenolol 12.5mg (1/2 tab of 25mg) once a day given persistent mild tremor  - educated pt that if she develops a fever or sore throat, jaundice, or rash that she must let her outpatient primary care doctor or endocrinologist know  - recommend f/u with outpatient Endocrinologist within 2 weeks of discharge for clinical and chemical monitoring. Will be following up with Dr. Nataly Olivarez at [facility]. She reports having appointment scheduled for July 24th.  - Pt should consider radioactive iodine therapy outpatient for long term treatment for hyperthyroidism. To be discussed further with outpatient Endo.

## 2020-07-06 NOTE — CHART NOTE - NSCHARTNOTEFT_GEN_A_CORE
R4  OB Chart Note    Pt requesting IV pain meds.  She has allergies to morphine (nausea/hives) and codeine (nausea).  Per pt she has never had dilaudid and is willing to try a small test dose.  Will administer dilaudid 1mg IVP with benadryl 25mg IVP and zofran 4mg IVP.      d/w Dr. Ines Plunkett MD PGY4

## 2020-07-06 NOTE — OB PROVIDER DELIVERY SUMMARY - NSPROVIDERDELIVERYNOTE_OBGYN_ALL_OB_FT
Patient felt pressure and had bloody show. Vaginal exam revealed feet in the vagina. Patient pushed and delivered demise male infant intact. Cord was clamped and cute and placenta then delivered spontaneously. . Good hemostasis noted. Count correctx2. No lacerations. BSS showed thin EM stripe. Placenta cultures taken and placenta to path. Patient felt pressure and had bloody show. Vaginal exam revealed feet in the vagina. Patient pushed and delivered demise male infant intact. Cord was clamped and cut and placenta then delivered spontaneously. . Good hemostasis noted. Count correctx2. No lacerations. BSS showed thin EM stripe. Placenta cultures taken and placenta to path.    ATT: I was present and supervised delivery -DEANDRE Chacon MD

## 2020-07-06 NOTE — DISCHARGE NOTE OB - PROVIDER TOKENS
PROVIDER:[TOKEN:[[id number]]] PROVIDER:[TOKEN:[[id number]]],PROVIDER:[TOKEN:[[id number]]]

## 2020-07-06 NOTE — DISCHARGE NOTE OB - PATIENT PORTAL LINK FT
You can access the FollowMyHealth Patient Portal offered by [facility] by registering at the following website: [email or web address]. By joining [facility]’s FollowMyHealth portal, you will also be able to view your health information using other applications (apps) compatible with our system.

## 2020-07-06 NOTE — DISCHARGE NOTE OB - HOSPITAL COURSE
35 yo  with poorly controlled hyperthyroidism 2/2 Grave's disease now s/p  of demised fetus at 18w5d 2/2 PPROM. On admission Fetal ECHO and ATU sono performed. ATU sono shows thickened globular appearing placenta with vessel visualized. Fetal tachycardia noted. Amniotic fluid appears decreased. Fetal tachycardia suspicious for fetal thyrotoxicosis. On  patient PPROM'd and had  of fetal demise at 18.5weeks. Pt given one dose of Invanz postdelivery and amp/gent during IOL. Pt currently with no signs of infection, WBC wnl and afebrile. TFTs now improving on Methimazole 30mg daily with endocrine following. Per Endocrine patient cam be discharged home on methimazole 20mg and atenalol 12.5mg daily with close outpatient follow up with endocrine and OB outpatient.

## 2020-07-06 NOTE — DISCHARGE NOTE OB - PLAN OF CARE
Recovery - Follow up with endocrinologist within one week  - Follow up with OB doctor within 2 weeks  - Return with fever, chills, increased bleeding, increased pain, shortness of breath, palpitations - Follow up with endocrinologist Dr. Nataly Olivarez at [facility]. scheduled for July 24th.  - Follow up with OB doctor within 2 weeks  - Return with fever, chills, increased bleeding, increased pain, shortness of breath, palpitations

## 2020-07-06 NOTE — PROVIDER CONTACT NOTE (OTHER) - ASSESSMENT
Fundus firm and 4 below umbilicus. Moderate rubra lochia with 50 cc clots expressed on fundal check.   VSS BP 94/46 HR 71 O2 94% Temp 36.8 RR 18  Denies any pain interventions at this time  Pitocin continues @125 mL/hr

## 2020-07-06 NOTE — DISCHARGE NOTE OB - WITHDRAWAL SYMPTOMS INCLUDE; NEGATIVE MOOD, URGES TO SMOKE, AND DIFFICULTY CONCENTRATING.
Statement Selected Keep area clean and dry.   Monitor for signs of inflection.   Keep finger in splint.   Follow up with hand surgery for further management.

## 2020-07-06 NOTE — CHART NOTE - NSCHARTNOTEFT_GEN_A_CORE
R3 Labor Note    At bedside for VC#2 placement    Vital Signs Last 24 Hrs  T(C): 36.6 (05 Jul 2020 23:35), Max: 36.9 (05 Jul 2020 18:03)  T(F): 97.88 (05 Jul 2020 23:35), Max: 98.5 (05 Jul 2020 18:03)  HR: 81 (06 Jul 2020 02:01) (70 - 92)  BP: 120/56 (05 Jul 2020 23:35) (103/44 - 125/56)  RR: 17 (05 Jul 2020 18:03) (16 - 17)  SpO2: 93% (06 Jul 2020 02:01) (89% - 100%)    Yeguada: none    CERVICAL EXAM: 0.5/50/-3    PAIN SCALE (0-10): 4/10    2nd dose of 400mcg vaginal cytotec placed. Will re-evaluate in 3 hours. Senna for constipation. Afebrile currently.     D/w Dr. Luther Lomeli PGY-3

## 2020-07-06 NOTE — DISCHARGE NOTE OB - CARE PLAN
Principal Discharge DX:	Hyperthyroidism affecting pregnancy in second trimester  Goal:	Recovery  Assessment and plan of treatment:	- Follow up with endocrinologist within one week  - Follow up with OB doctor within 2 weeks  - Return with fever, chills, increased bleeding, increased pain, shortness of breath, palpitations Principal Discharge DX:	Hyperthyroidism affecting pregnancy in second trimester  Goal:	Recovery  Assessment and plan of treatment:	- Follow up with endocrinologist Dr. Nataly Olivarez at [facility]. scheduled for July 24th.  - Follow up with OB doctor within 2 weeks  - Return with fever, chills, increased bleeding, increased pain, shortness of breath, palpitations

## 2020-07-06 NOTE — PROGRESS NOTE ADULT - SUBJECTIVE AND OBJECTIVE BOX
Patient seen and examined at bedside. Unfortunately, pt had fetal demise yesterday. Today, she and he partner are understandable upset and feeling down. In terms of hyperthyroid symptoms, pt states she is feeling better - states she is no longer breathing heavy and no longer feels palpitations. Does admit to persistent mild hand tremor. Denies feeling very hot or sweating. Denies fever, chills, sore throat, or rash.    MEDICATIONS  (STANDING):  acetaminophen   Tablet .. 975 milliGRAM(s) Oral <User Schedule>  calcium carbonate    500 mG (Tums) Chewable 1 Tablet(s) Chew two times a day  diphenhydrAMINE 50 milliGRAM(s) Oral at bedtime  diphtheria/tetanus/pertussis (acellular) Vaccine (ADAcel) 0.5 milliLiter(s) IntraMuscular once  ibuprofen  Tablet. 600 milliGRAM(s) Oral every 6 hours  ketorolac   Injectable 30 milliGRAM(s) IV Push once  lactated ringers. 1000 milliLiter(s) (125 mL/Hr) IV Continuous <Continuous>  methimazole 30 milliGRAM(s) Oral daily  oxytocin Infusion 333.333 milliUNIT(s)/Min (1000 mL/Hr) IV Continuous <Continuous>  prenatal multivitamin 1 Tablet(s) Oral daily  propranolol 10 milliGRAM(s) Oral every 8 hours  pyridoxine 50 milliGRAM(s) Oral every 12 hours  sodium chloride 0.9% lock flush 3 milliLiter(s) IV Push every 8 hours    MEDICATIONS  (PRN):  ALBUTerol    90 MICROgram(s) HFA Inhaler 2 Puff(s) Inhalation every 6 hours PRN Shortness of Breath and/or Wheezing  benzocaine 20%/menthol 0.5% Spray 1 Spray(s) Topical every 6 hours PRN for Perineal discomfort  dibucaine 1% Ointment 1 Application(s) Topical every 6 hours PRN Perineal discomfort  diphenhydrAMINE 25 milliGRAM(s) Oral every 6 hours PRN Pruritus  hydrocortisone 1% Cream 1 Application(s) Topical every 6 hours PRN Moderate Pain (4-6)  lanolin Ointment 1 Application(s) Topical every 6 hours PRN nipple soreness  magnesium hydroxide Suspension 30 milliLiter(s) Oral two times a day PRN Constipation  ondansetron    Tablet 4 milliGRAM(s) Oral every 6 hours PRN Nausea and/or Vomiting  pramoxine 1%/zinc 5% Cream 1 Application(s) Topical every 4 hours PRN Moderate Pain (4-6)  simethicone 80 milliGRAM(s) Chew every 4 hours PRN Gas  witch hazel Pads 1 Application(s) Topical every 4 hours PRN Perineal discomfort    Allergies  codeine (Pruritus)  morphine (Vomiting; Nausea)    Review of Systems:  Constitutional: No fever  Eyes: No blurry vision  Neuro: + tremors  HEENT: No pain  Cardiovascular: No chest pain, palpitations  Respiratory: no SOB, no cough  GI: No nausea, vomiting, abdominal pain  Skin: no rash  Psych: feels down/depressed after losing fetus  ALL OTHER SYSTEMS REVIEWED AND NEGATIVE    PHYSICAL EXAM:  Vital Signs Last 24 Hrs  T(C): 37.2 (06 Jul 2020 17:47), Max: 37.2 (06 Jul 2020 17:47)  T(F): 99 (06 Jul 2020 17:47), Max: 99 (06 Jul 2020 17:47)  HR: 74 (06 Jul 2020 17:47) (65 - 97)  BP: 114/53 (06 Jul 2020 17:47) (94/46 - 125/56)  BP(mean): --  RR: 18 (06 Jul 2020 17:47) (17 - 18)  SpO2: 98% (06 Jul 2020 17:47) (89% - 100%)  Wt(kg): --  GENERAL: NAD, well-groomed, well-developed  EYES: + proptosis, anicteric   HEENT:  Atraumatic, Normocephalic, moist mucous membranes  THYROID: diffusely enlarged thyroid, non-tender  RESPIRATORY: respiratory effort unlabored, no use of accessory muscles  CARDIOVASCULAR: Regular rate and rhythm  GI: soft, NT/ND  SKIN: Dry, intact, No rashes or lesions  MUSCULOSKELETAL: moves all extremities  NEURO: +tremors  PSYCH: Alert and oriented x 3, sad appearing    LABS:                      9.4    8.00  )-----------( 135      ( 05 Jul 2020 20:00 )             28.1     TSI: 844 (ref: 0.00 - 0.55)  TSH receptor Ab: 321 (ref: 0.00 - 1.75)  TPO Ab: 1090 (ref: <34.9) Patient seen and examined at bedside. Unfortunately, pt had fetal demise yesterday (7/5/2020). Today, she and he partner are understandable upset and feeling down. In terms of hyperthyroid symptoms, pt states she is feeling better - states she is no longer breathing heavy and no longer feels palpitations. Does admit to persistent mild hand tremor. Denies feeling very hot or sweating. Denies fever, chills, sore throat, or rash.    MEDICATIONS  (STANDING):  acetaminophen   Tablet .. 975 milliGRAM(s) Oral <User Schedule>  calcium carbonate    500 mG (Tums) Chewable 1 Tablet(s) Chew two times a day  diphenhydrAMINE 50 milliGRAM(s) Oral at bedtime  diphtheria/tetanus/pertussis (acellular) Vaccine (ADAcel) 0.5 milliLiter(s) IntraMuscular once  ibuprofen  Tablet. 600 milliGRAM(s) Oral every 6 hours  ketorolac   Injectable 30 milliGRAM(s) IV Push once  lactated ringers. 1000 milliLiter(s) (125 mL/Hr) IV Continuous <Continuous>  methimazole 30 milliGRAM(s) Oral daily  oxytocin Infusion 333.333 milliUNIT(s)/Min (1000 mL/Hr) IV Continuous <Continuous>  prenatal multivitamin 1 Tablet(s) Oral daily  propranolol 10 milliGRAM(s) Oral every 8 hours  pyridoxine 50 milliGRAM(s) Oral every 12 hours  sodium chloride 0.9% lock flush 3 milliLiter(s) IV Push every 8 hours    MEDICATIONS  (PRN):  ALBUTerol    90 MICROgram(s) HFA Inhaler 2 Puff(s) Inhalation every 6 hours PRN Shortness of Breath and/or Wheezing  benzocaine 20%/menthol 0.5% Spray 1 Spray(s) Topical every 6 hours PRN for Perineal discomfort  dibucaine 1% Ointment 1 Application(s) Topical every 6 hours PRN Perineal discomfort  diphenhydrAMINE 25 milliGRAM(s) Oral every 6 hours PRN Pruritus  hydrocortisone 1% Cream 1 Application(s) Topical every 6 hours PRN Moderate Pain (4-6)  lanolin Ointment 1 Application(s) Topical every 6 hours PRN nipple soreness  magnesium hydroxide Suspension 30 milliLiter(s) Oral two times a day PRN Constipation  ondansetron    Tablet 4 milliGRAM(s) Oral every 6 hours PRN Nausea and/or Vomiting  pramoxine 1%/zinc 5% Cream 1 Application(s) Topical every 4 hours PRN Moderate Pain (4-6)  simethicone 80 milliGRAM(s) Chew every 4 hours PRN Gas  witch hazel Pads 1 Application(s) Topical every 4 hours PRN Perineal discomfort    Allergies  codeine (Pruritus)  morphine (Vomiting; Nausea)    Review of Systems:  Constitutional: No fever  Eyes: No blurry vision  Neuro: + tremors  HEENT: No pain  Cardiovascular: No chest pain, palpitations  Respiratory: no SOB, no cough  GI: No nausea, vomiting, abdominal pain  Skin: no rash  Psych: feels down/depressed after losing fetus  ALL OTHER SYSTEMS REVIEWED AND NEGATIVE    PHYSICAL EXAM:  Vital Signs Last 24 Hrs  T(C): 37.2 (06 Jul 2020 17:47), Max: 37.2 (06 Jul 2020 17:47)  T(F): 99 (06 Jul 2020 17:47), Max: 99 (06 Jul 2020 17:47)  HR: 74 (06 Jul 2020 17:47) (65 - 97)  BP: 114/53 (06 Jul 2020 17:47) (94/46 - 125/56)  BP(mean): --  RR: 18 (06 Jul 2020 17:47) (17 - 18)  SpO2: 98% (06 Jul 2020 17:47) (89% - 100%)  Wt(kg): --  GENERAL: NAD, well-groomed, well-developed  EYES: + proptosis, anicteric   HEENT:  Atraumatic, Normocephalic, moist mucous membranes  THYROID: diffusely enlarged thyroid, non-tender  RESPIRATORY: respiratory effort unlabored, no use of accessory muscles  CARDIOVASCULAR: Regular rate and rhythm  GI: soft, NT/ND  SKIN: Dry, intact, No rashes or lesions  MUSCULOSKELETAL: moves all extremities  NEURO: +tremors  PSYCH: Alert and oriented x 3, sad appearing    LABS:                      9.4    8.00  )-----------( 135      ( 05 Jul 2020 20:00 )             28.1     TSI: 844 (ref: 0.00 - 0.55)  TSH receptor Ab: 321 (ref: 0.00 - 1.75)  TPO Ab: 1090 (ref: <34.9)

## 2020-07-06 NOTE — PROGRESS NOTE ADULT - ATTENDING COMMENTS
MFM Attending  I agree with the residents assessment and plan.
MFM Attending  PATIENT FEELS WELL AND denies any symptoms.  Bedside sono with fetal heart rate of 176 and large globular placenta. Gestational sac seems compressed by large placenta. Amniotic fluid is present. This is an anterior placenta.  Fetal heart rate likely related to maternal Hyperthyroidism. The globular placenta could represent a chorioangioma. There is no fetal hydrops today.  Patient awaiting thyroid ultrasound and repeat TFT'S
PAtient seen and examined.  For fetal echocardiogram and repeat ultrasound scan  Will follow u p with endocrine consult.  May need higher dose of methimazole but will defer to endocrinology.  The findings of decreased fluid with globular placenta and fetal tachycardia is highly suggestive of fetal thyrotoxicosis and requires antithyroid medication in addition to B blockers  Will continue to follow
34F with Graves disease, severe hyperthyroidism in pregnancy and now s/p fetal demise.  Hyperthyroidism improved rapidly on methimazole 30mg daily. Will therefore dc on lower dose of 20mg daily.  She will need TFTs rechecked in about 2 weeks for further dose titration.  Recommend long acting beta blocker atenolol lowest dose given BP for control of hyperthyroid symptoms.  Counselled patient at length regarding options for long term treatment of Graves' disease including radioactive iodine and surgery and the need to have thyroid status controlled prior to conception in the future. Counselled that methimazole is not compatible with first trimester pregnancy and would need to be switched to PTU in that situation. Consider long term treatment of hyperthyroid with BOSWELL or surgery prior to next pregnancy. These issues will need to be discussed further with outpatient endocrinologist and she will need to follow up closely to maintain normal thyroid levels. All questions answered. For dc home this evening.

## 2020-07-06 NOTE — PROGRESS NOTE ADULT - SUBJECTIVE AND OBJECTIVE BOX
MFM DAILY PROGRESS NOTE:     S:   pt seen and examined by bedside. S/p  at 4:53 am, , placenta delivered intact. Pt feeling well overall. Partner at bedside. Denies any breast engorgement or any abdominal pain. Has been able to tolerate breakfast.   ROS negative.     OBJECTIVE:   Vital Signs Last 24 Hrs  T(C): 36.8 (2020 08:28), Max: 37.1 (2020 01:53)  T(F): 98.2 (2020 08:28), Max: 98.78 (2020 01:53)  HR: 77 (2020 10:04) (65 - 97)  BP: 115/56 (2020 10:04) (94/46 - 125/56)  BP(mean): --  RR: 17 (2020 18:03) (16 - 17)  SpO2: 96% (2020 10:01) (89% - 100%)    PHYSICAL EXAM:  Gen: NAD  Pulm: normal effort  Abd: soft, non-tender, non-distended       LABS:                        9.4    8.00  )-----------( 135      ( 2020 20:00 )             28.1     07-05    136  |  104  |  8   ----------------------------<  112<H>  4.2   |  22  |  0.46<L>    Ca    9.2      2020 20:00    TPro  6.6  /  Alb  3.3  /  TBili  0.7  /  DBili  x   /  AST  32  /  ALT  25  /  AlkPhos  111  07-05    PT/INR - ( 2020 20:00 )   PT: 13.3 SEC;   INR: 1.16          PTT - ( 2020 20:00 )  PTT:29.2 SEC

## 2020-07-06 NOTE — DISCHARGE NOTE OB - MEDICATION SUMMARY - MEDICATIONS TO TAKE
I will START or STAY ON the medications listed below when I get home from the hospital:    acetaminophen 325 mg oral tablet  -- 3 tab(s) by mouth   -- Indication: For pain    ibuprofen 600 mg oral tablet  -- 1 tab(s) by mouth every 6 hours  -- Indication: For pain    methIMAzole 10 mg oral tablet  -- 2 tab(s) by mouth once a day   -- Do not take this drug if you are pregnant.  It is very important that you take or use this exactly as directed.  Do not skip doses or discontinue unless directed by your doctor.    -- Indication: For Hyperthyroidism    atenolol 25 mg oral tablet  -- 0.5 tab(s) by mouth once a day   -- Do not take this drug if you are pregnant.  It is very important that you take or use this exactly as directed.  Do not skip doses or discontinue unless directed by your doctor.  May cause drowsiness.  Alcohol may intensify this effect.  Use care when operating dangerous machinery.  Some non-prescription drugs may aggravate your condition.  Read all labels carefully.  If a warning appears, check with your doctor before taking.    -- Indication: For Hyperthyroidism

## 2020-07-06 NOTE — DISCHARGE NOTE OB - CARE PROVIDER_API CALL
Edgardo Moe  OBSTETRICS AND GYNECOLOGY  [address] [facility] [place] [address]  Phone: [phone]  Fax: [phone]  Follow Up Time: Edgardo Moe  OBSTETRICS AND GYNECOLOGY  [address] [place] [address]  Phone: [phone]  Fax: [phone]  Follow Up Time:     Nataly Olivarez  EndocrinologyMetabDiabetes  [address] [place] [address]  Phone: [phone]  Fax: [phone]  Follow Up Time:

## 2020-07-07 ENCOUNTER — APPOINTMENT (OUTPATIENT)
Dept: ANTEPARTUM | Facility: CLINIC | Age: 35
End: 2020-07-07

## 2020-07-07 VITALS
SYSTOLIC BLOOD PRESSURE: 115 MMHG | OXYGEN SATURATION: 99 % | RESPIRATION RATE: 16 BRPM | DIASTOLIC BLOOD PRESSURE: 46 MMHG | TEMPERATURE: 98 F | HEART RATE: 71 BPM

## 2020-07-07 DIAGNOSIS — O36.4XX0 MATERNAL CARE FOR INTRAUTERINE DEATH, NOT APPLICABLE OR UNSPECIFIED: ICD-10-CM

## 2020-07-07 LAB
HCT VFR BLD CALC: 26.3 % — LOW (ref 34.5–45)
HGB BLD-MCNC: 8.7 G/DL — LOW (ref 11.5–15.5)
MCHC RBC-ENTMCNC: 28.8 PG — SIGNIFICANT CHANGE UP (ref 27–34)
MCHC RBC-ENTMCNC: 33.1 % — SIGNIFICANT CHANGE UP (ref 32–36)
MCV RBC AUTO: 87.1 FL — SIGNIFICANT CHANGE UP (ref 80–100)
NRBC # FLD: 0 K/UL — SIGNIFICANT CHANGE UP (ref 0–0)
PLATELET # BLD AUTO: 128 K/UL — LOW (ref 150–400)
PMV BLD: 12.1 FL — SIGNIFICANT CHANGE UP (ref 7–13)
RBC # BLD: 3.02 M/UL — LOW (ref 3.8–5.2)
RBC # FLD: 16.1 % — HIGH (ref 10.3–14.5)
WBC # BLD: 4.23 K/UL — SIGNIFICANT CHANGE UP (ref 3.8–10.5)
WBC # FLD AUTO: 4.23 K/UL — SIGNIFICANT CHANGE UP (ref 3.8–10.5)

## 2020-07-07 PROCEDURE — 99232 SBSQ HOSP IP/OBS MODERATE 35: CPT | Mod: GC

## 2020-07-07 RX ORDER — ATENOLOL 25 MG/1
12.5 TABLET ORAL DAILY
Refills: 0 | Status: DISCONTINUED | OUTPATIENT
Start: 2020-07-07 | End: 2020-07-07

## 2020-07-07 NOTE — CHART NOTE - NSCHARTNOTESELECT_GEN_ALL_CORE
Endocrinology/Event Note
Event Note/R/O PPPROM
Event Note
Event Note
Event Note/OB
Follow up/Event Note
Labor Note
OB/Event Note
OB/Event Note

## 2020-07-07 NOTE — PROGRESS NOTE ADULT - SUBJECTIVE AND OBJECTIVE BOX
OB Progress Note:  PPD#1    S: 33yo admitted on antepartum for hyperthyroidism and fetal tachycardia now PPD#1 s/p PPROM at 18w and vaginal delivery. Denies pain this morning She is tolerating a regular diet. She is voiding spontaneously and ambulating without difficulty. Denies CP/SOB. Denies lightheadedness/dizziness. Denies N/V.    O:  Vitals:  Vital Signs Last 24 Hrs  T(C): 36.6 (2020 06:21), Max: 37.2 (2020 17:47)  T(F): 97.8 (2020 06:21), Max: 99 (2020 17:47)  HR: 71 (2020 06:21) (65 - 86)  BP: 115/46 (2020 06:21) (106/52 - 129/49)  BP(mean): --  RR: 16 (2020 06:21) (16 - 18)  SpO2: 99% (2020 06:21) (93% - 99%)    MEDICATIONS  (STANDING):  acetaminophen   Tablet .. 975 milliGRAM(s) Oral <User Schedule>  calcium carbonate    500 mG (Tums) Chewable 1 Tablet(s) Chew two times a day  diphenhydrAMINE 50 milliGRAM(s) Oral at bedtime  diphtheria/tetanus/pertussis (acellular) Vaccine (ADAcel) 0.5 milliLiter(s) IntraMuscular once  ibuprofen  Tablet. 600 milliGRAM(s) Oral every 6 hours  ketorolac   Injectable 30 milliGRAM(s) IV Push once  lactated ringers. 1000 milliLiter(s) (125 mL/Hr) IV Continuous <Continuous>  methimazole 30 milliGRAM(s) Oral daily  oxytocin Infusion 333.333 milliUNIT(s)/Min (1000 mL/Hr) IV Continuous <Continuous>  prenatal multivitamin 1 Tablet(s) Oral daily  propranolol 10 milliGRAM(s) Oral every 8 hours  pyridoxine 50 milliGRAM(s) Oral every 12 hours  sodium chloride 0.9% lock flush 3 milliLiter(s) IV Push every 8 hours      Labs:  Blood type: O Positive  Rubella IgG: RPR: Negative                          8.7<L>   4.23 >-----------< 128<L>    (  @ 06:30 )             26.3<L>                        9.4<L>   8.00 >-----------< 135<L>    (  @ 20:00 )             28.1<L>    20 @ 20:00      136  |  104  |  8   ----------------------------<  112<H>  4.2   |  22  |  0.46<L>        Ca    9.2      2020 20:00    TPro  6.6  /  Alb  3.3  /  TBili  0.7  /  DBili  x   /  AST  32  /  ALT  25  /  AlkPhos  111  20 @ 20:00          Physical Exam:  General: NAD  Abdomen: soft, non-tender, non-distended, fundus firm  Extremities: No erythema/edema

## 2020-07-07 NOTE — PROGRESS NOTE ADULT - PROBLEM SELECTOR PROBLEM 2
Fetal tachycardia affecting management of mother
Fetal demise affecting delivery
Fetal tachycardia affecting management of mother

## 2020-07-07 NOTE — CHART NOTE - NSCHARTNOTEFT_GEN_A_CORE
Spoke to Dr. Edgardo Moe Pt Primary OB doctor regarding hospital course and follow up for patient. Dr. Moe aware of hospital course and has been added to follow up on pathology sent at [facility]. Pt has follow up with Endocrinology on July 24th and will follow up with Dr. Moe in office in 2-3 weeks.     All questions answered.     Veronica Cuba [facility]-BC

## 2020-07-07 NOTE — PROGRESS NOTE ADULT - PROBLEM SELECTOR PLAN 2
-ATU sono (7/2): anterior placenta, . Amniotic fluid decreased. Thickened, globular placenta. CL 2.45cm. Echogenic bowel.  -Rpt ATU sono 7/6  -Fetal ECHO performed, awaiting results and input from peds cardiology  -Previable, fetal monitoring not required
-high risk pregnancy with risk of both hypo/hyperthyroidism in the fetus  -fetal ultrasound will be necessary    Patient is going to establish new endocrine care and already has an appointment with Dr. Onofre Olivarez at [facility].  These things were discussed with the patient in detail
- s/p fetal demise likely related to patient's uncontrolled hyperthyroidism and persistent fetal tachycardia  - pt should discuss options of long term treatment of hyperthyroidism (such as radioactive iodine therapy) to prevent episodes of uncontrolled hyperthyroidism during future pregnancies. Of note, pt would need to wait 6 months after BOSWELL before becoming pregnant.
-Continue regular diet  -SCDs  -Discharge planning    jasmin pgy3

## 2020-07-07 NOTE — PROGRESS NOTE ADULT - ASSESSMENT
Assessment:     33 y/o  at 18w3d LMP  with COURTNEY of 20 admitted for hyperthyroidism and fetal tachycardia.     In regards to hyperthyroidism overall TFTs improving with current methimazole medications. Endocrinology following and recommend thyroid ultrasound for further evaluation and to rule out toxic nodule.     Fetal heart also improving now in 160s at bedside and no evidence of fetal hydrops.  Sonogram was concerning for globular appearing placenta and decreased amnionitic fluid. Secondary to echogenic bowel and placenta, will consider amniocentesis to rule out possible genetic abnormalities. Will perform sonogram on  at the ATU and reassess at that time.    Will continue inpatient managent until work up is compelte secondary to severe hyperthyroidism and will consider discharge when patient clinically improves.     Seen and examined Dr. Bethea ([facility] Attending)     Urmila Casper MD   Maternal Fetal Medicine Fellow
31 y/o  at 18w3d LMP  with CORUTNEY of 20 admitted for hyperthyroidism and fetal tachycardia. Patient diagnosed with hyperthyroidism in March and was started on propanolol for symptoms. She was not started on PTU and later that month discovered she was pregnant. Thyroid panel redemonstrates severe hyperthyroid state. Fetal ECHO and ATU sono performed. ATU sono shows thickened globular appearing placenta with vessel visualized. Fetal tachycardia noted. Amniotic fluid appears decreased. Fetal tachycardia suspicious for fetal thyrotoxicosis
33 y/o  admitted for hyperthyroidism and fetal tachycardia now s/p vaginal delivery after PPROM at 18w5d. Pt is stable from obstetric standpoint. Endocrine following for management of hyperthyroidism.
33 yo F with past hx of hashimoto's hypothyroidism, presented at 18 weeks gestation with overt hyperthyroidism and fetal tachycardia, found to have Grave's disease with markedly elevated TSI, TSH receptor Ab, and TPO Ab, treated with propanol and high dose methimazole. Now s/p fetal demise with vaginal delivery on 7/5.
A/P:   35 yo  with poorly controlled hyperthyroidism 2/2 Grave's disease now on PPD 1 s/p  of demised fetus at 18w5d 2/2 PPROM. Paitent currently with no e/o infection, WBC wnl and afebrile. TFTs now improving on Methimazole 30mg daily with endocrine following. Overall status reassuring.     # Postpartum:   - Routine care,   - Montior breasts for engorgement, for supportive bra and ice prn   - For CBC tomorrow     # Hyperthyroidism 2/2 Grave's disease   - Appreciate reccs by endocrine   - US of thyroid c/w Graves / thyroiditis, no e/o nodules   - TFTs improving   - Pt continues on propranolol 25mg daily, to downtrend outpatient as appropriate   - For outpatient endocrine f/u, patient reports she has an appointment at [facility] already   - For discharge labs tomorrow prior to leaving     [facility] MELA Martin Fellow, PYG 5  Seen and discussed with Dr. Bowles
31 y/o  at 18w1d LMP  with COURTNEY of 20 admitted for hyperthyroidism and fetal tachycardia. Patient diagnosed with hyperthyroidism in March and was started on propanolol for symptoms. She was not started on PTU and later that month discovered she was pregnant. Pt was started on 5mg methimazole yesterday prior to admission. Thyroid panel redemonstrates hyperthyroid state
31 y/o  at 18w2d LMP  with COURTNEY of 20 admitted for hyperthyroidism and fetal tachycardia. Patient diagnosed with hyperthyroidism in March and was started on propanolol for symptoms. She was not started on PTU and later that month discovered she was pregnant. Thyroid panel redemonstrates severe hyperthyroid state. Pt seen by endocrinology inpatient yesterday. Fetal ECHO and ATU sono performed yesterday, ATU sono shows Thickened globular appearing placenta with vessel visualized. Fetal tachycardia noted. Amniotic fluid appears decreased.Echogenic bowel visualized on today's sonogram CL 2.45cm. EFW 178g (3%). Fetal tachycardia suspicious for fetal thyrotoxicosis
31 y/o  at 18w2d LMP  with COURTNEY of 20 admitted for hyperthyroidism and fetal tachycardia. Patient diagnosed with hyperthyroidism in March and was started on propanolol for symptoms. She was not started on PTU and later that month discovered she was pregnant. Thyroid panel redemonstrates severe hyperthyroid state. Fetal ECHO and ATU sono performed. ATU sono shows thickened globular appearing placenta with vessel visualized. Fetal tachycardia noted. Amniotic fluid appears decreased.Echogenic bowel visualized on today's sonogram CL 2.45cm. EFW 178g (3%). Fetal tachycardia suspicious for fetal thyrotoxicosis
35 yo woman, currently 18 weeks gestation presents with fetal tachycardiac and found to have hyperthyroidism TSH<0.1, total T4 24.86, total T3 494. Endocrine consulted for hyperthyroidism.   Patient had a history of hypothyroidism years ago and was following with endocrinologist Dr. Nicole.  Patient had symptoms of hyperthyroidism beginning in February and was told in endocrine follow up that she had hyperthyroidism but not started on anything medications.  Then, she had unexpected conception.  Currently admitted for hyperthyroidism in pregnancy (high risk patient)

## 2020-07-07 NOTE — PROGRESS NOTE ADULT - PROBLEM SELECTOR PROBLEM 1
Hyperthyroidism
Hyperthyroidism affecting pregnancy in second trimester
Hyperthyroidism
Hyperthyroidism affecting pregnancy in second trimester

## 2020-07-07 NOTE — PROGRESS NOTE ADULT - PROBLEM SELECTOR PLAN 1
-T4: 25  TSH: < 0.1, TPO antibodies 1090  -Endo recs: discharge on Methimazone 20 and Atenolol 12.5 daily

## 2020-07-08 LAB — TSI ACT/NOR SER: 846 IU/L — HIGH (ref 0–0.55)

## 2020-07-09 LAB — THYROGLOB SERPL-MCNC: SIGNIFICANT CHANGE UP

## 2020-07-17 LAB — SURGICAL PATHOLOGY STUDY: SIGNIFICANT CHANGE UP

## 2020-07-24 ENCOUNTER — APPOINTMENT (OUTPATIENT)
Dept: ENDOCRINOLOGY | Facility: CLINIC | Age: 35
End: 2020-07-24
Payer: COMMERCIAL

## 2020-07-24 VITALS
HEART RATE: 79 BPM | WEIGHT: 165 LBS | HEIGHT: 69 IN | BODY MASS INDEX: 24.44 KG/M2 | SYSTOLIC BLOOD PRESSURE: 113 MMHG | DIASTOLIC BLOOD PRESSURE: 65 MMHG

## 2020-07-24 PROCEDURE — 99243 OFF/OP CNSLTJ NEW/EST LOW 30: CPT

## 2020-07-24 RX ORDER — ALPRAZOLAM 0.5 MG/1
0.5 TABLET ORAL
Qty: 20 | Refills: 0 | Status: DISCONTINUED | COMMUNITY
Start: 2018-04-25 | End: 2020-07-24

## 2020-07-24 RX ORDER — BUDESONIDE AND FORMOTEROL FUMARATE DIHYDRATE 80; 4.5 UG/1; UG/1
80-4.5 AEROSOL RESPIRATORY (INHALATION)
Refills: 0 | Status: ACTIVE | COMMUNITY

## 2020-07-24 RX ORDER — ESCITALOPRAM OXALATE 5 MG/1
TABLET, FILM COATED ORAL
Refills: 0 | Status: DISCONTINUED | COMMUNITY
End: 2020-07-24

## 2020-07-24 NOTE — PHYSICAL EXAM
[Alert] : alert [Normal Hearing] : hearing was normal [Healthy Appearance] : healthy appearance [Clear to Auscultation] : lungs were clear to auscultation bilaterally [No LAD] : no lymphadenopathy [Regular Rhythm] : with a regular rhythm [Normal S1, S2] : normal S1 and S2 [de-identified] : Paco exophthalmometer 14mm bilateral [de-identified] : very large goiter, (+) bruit

## 2020-07-24 NOTE — HISTORY OF PRESENT ILLNESS
[FreeTextEntry1] : First diagnosed with "positive antibodies"  in 2008 but not given any medication.  At the time, neck was swollen but then resolved.  in 2016, she noted goiter again and was sent to a different endocrinologist (Dr Nicole) and she was prescribed Brooten thyroid for Hashimoto's in 2018.   Soon after, she became pregnant with first child.\par ABout 1 year post partum, in Feb 2020, she noted hyperthyroid symptoms--heat intolerance, weight loss despite having normal appetite, fatigue, heart racing, increased anxiety.  By March, she felt that her body was shutting down.  She went to ED but was sent home and was told she had hyperthyroidism.\par Prior to this, her PCP had told her that her Brooten dose should be reduced or stopped.  When she saw her endocrinologist after the ED visit, then Brooten was stopped and she was put on propranolol 20mg TID which was eventually increased to QID.\par Soon after this, she learned she was pregnant. At 18 weeks, her water broke and she had to deliver.\par Currently, she is taking methimazole 10mg bid and feeling better.  palpitations and HUTSON are improved greatly.  SHe is feeling less hot.  She still has some anxiety that is more than her baseline.  BMs are back to her usual, every other day.\par Eyes feel pressure, especially whe looking up or to the side.  She has intermittent blurriness.\par pre pregnancy weight was 175 lb.  She lost about 40lb (pregnancy weight plus more)\par paternal aunt and paternal grandmother have thyroid disease \par \par Meds:\par methimazole 10mg bid\par atenolol 25mg, half tab/day (given by ED) but she also has propranolol tabs at home\par Symbicort, Allegra prn\par

## 2020-07-24 NOTE — REASON FOR VISIT
[Initial Evaluation] : an initial evaluation [Hyperthyroidism] : hyperthyroidism [FreeTextEntry2] : Dr Hurtado

## 2020-07-24 NOTE — ASSESSMENT
[FreeTextEntry1] : Graves disease, I suspect it was precipitated in the post partum period and then progressed to thyrotoxic state.\par will check TFTs today and adjust methimazole, if needed.\par First step is to control the hyperthyroidism, then will decide how to proceed--continue medication or proceed with definitive therapy, which would be surgery or radioiodine.  Her goiter is very large, which makes remission with medication less likely.   BOSWELL can shrink goiter size, but may worsen eye disease and she would have to delay pregnancy for 6-12 months. Also, she would have to isolate from her baby for 2-7 days following BOSWELL.  Surgery may be difficult since her gland is so large  and probably very vascular, but usually improves eye disease and the TFT course is more predictable (she just becomes hypothyroid.  But with BOSWELL, TFTs usually flare up for 1 month after treatment before going hypothyroid).\par There is also a possibility that she may revert back to Hashimoto's.\par will refer her to see Graves eye specialist and check thyroid sono to evaluate her goiter.\par RTO 2 months\par

## 2020-07-24 NOTE — REVIEW OF SYSTEMS
[Recent Weight Loss (___ Lbs)] : recent weight loss: [unfilled] lbs [Nausea] : nausea [Constipation] : constipation [Anxiety] : anxiety [All other systems negative] : All other systems negative

## 2020-07-24 NOTE — DATA REVIEWED
[FreeTextEntry1] : 7/5/20: T4 24.86, free T4 3.9, T3 284.8\par 7/3/20: T4 19.33, free T4 7.6, T3 374.3, TPO 1090, TSh Rec Ab 321

## 2020-07-27 LAB
ALBUMIN SERPL ELPH-MCNC: 4.1 G/DL
ALP BLD-CCNC: 143 U/L
ALT SERPL-CCNC: 30 U/L
ANION GAP SERPL CALC-SCNC: 15 MMOL/L
AST SERPL-CCNC: 36 U/L
BILIRUB SERPL-MCNC: 0.3 MG/DL
BUN SERPL-MCNC: 10 MG/DL
CALCIUM SERPL-MCNC: 9 MG/DL
CHLORIDE SERPL-SCNC: 103 MMOL/L
CO2 SERPL-SCNC: 24 MMOL/L
CREAT SERPL-MCNC: 0.42 MG/DL
GLUCOSE SERPL-MCNC: 100 MG/DL
POTASSIUM SERPL-SCNC: 4 MMOL/L
PROT SERPL-MCNC: 7.4 G/DL
SODIUM SERPL-SCNC: 142 MMOL/L
T3 SERPL-MCNC: 323 NG/DL
T4 FREE SERPL-MCNC: 1.8 NG/DL
TSH RECEPTOR AB: 296 IU/L
TSH SERPL-ACNC: <0.01 UIU/ML
TSI ACT/NOR SER: 667 IU/L

## 2020-09-14 ENCOUNTER — APPOINTMENT (OUTPATIENT)
Dept: ENDOCRINOLOGY | Facility: CLINIC | Age: 35
End: 2020-09-14
Payer: COMMERCIAL

## 2020-09-14 VITALS
BODY MASS INDEX: 26.43 KG/M2 | SYSTOLIC BLOOD PRESSURE: 106 MMHG | HEART RATE: 53 BPM | WEIGHT: 179 LBS | DIASTOLIC BLOOD PRESSURE: 56 MMHG

## 2020-09-14 PROCEDURE — 99214 OFFICE O/P EST MOD 30 MIN: CPT

## 2020-09-14 NOTE — ASSESSMENT
[FreeTextEntry1] : Graves\par Check TFTs today, if still hyperthyroid, will increase methimazole to 10mg TID\par Once she is euthyroid, will refer her for surgery for definitive treatment, since she is planning pregnancy in the future.  Given her eye symptoms, will avoid BOSWELL.   Referred her to neuro ophtho at [facility] (Dr Tolbert) to evaluate Graves eye disease. ok to continue selenium.\par send for thyroid sono to evaluate goiter.\par RTO 2 months

## 2020-09-14 NOTE — HISTORY OF PRESENT ILLNESS
[FreeTextEntry1] : no palpitations but is still taking propranolol BID\par was feeling less hot, but then began feeling hot again, when she is in the light (even indoor light)\par nails brittle and breaking the past two weeks, and hair loss increased\par no tremor.\par not sleeping well but son is also teething.  waking up during the night and then can't fall back asleep\par feeling pressure behind eyes, elisabeth in the mornings and in her R eye.  ophtho docs I gave her did not take her insurance.\par has been drinking soda and iced coffee to increase caffeine because of fatigue.  weight is up 14lb since last visit.  Her pre pregnancy weight is 170 lb.\par would like to know when she can start planning for next pregnancy\par \par Meds:\par methimazole 10mg bid\par propranolol bid\par selenium, prenatal vitamin\par

## 2020-09-14 NOTE — DATA REVIEWED
[FreeTextEntry1] : 7/27/20: T3 323, free T4 1.8, \par 7/5/20: T4 24.86, free T4 3.9, T3 284.8\par 7/3/20: T4 19.33, free T4 7.6, T3 374.3, TPO 1090, TSh Rec Ab 321

## 2020-09-14 NOTE — PHYSICAL EXAM
[Alert] : alert [Healthy Appearance] : healthy appearance [Normal Hearing] : hearing was normal [No LAD] : no lymphadenopathy [Normal S1, S2] : normal S1 and S2 [Clear to Auscultation] : lungs were clear to auscultation bilaterally [Regular Rhythm] : with a regular rhythm [de-identified] : Paco exophthalmometer 14mm R, 13mm L [de-identified] : very large goiter, (+) bruit

## 2020-09-15 ENCOUNTER — APPOINTMENT (OUTPATIENT)
Dept: MATERNAL FETAL MEDICINE | Facility: CLINIC | Age: 35
End: 2020-09-15
Payer: COMMERCIAL

## 2020-09-15 DIAGNOSIS — K76.0 FATTY (CHANGE OF) LIVER, NOT ELSEWHERE CLASSIFIED: ICD-10-CM

## 2020-09-15 DIAGNOSIS — J45.20 MILD INTERMITTENT ASTHMA, UNCOMPLICATED: ICD-10-CM

## 2020-09-15 DIAGNOSIS — Z31.69 ENCOUNTER FOR OTHER GENERAL COUNSELING AND ADVICE ON PROCREATION: ICD-10-CM

## 2020-09-15 DIAGNOSIS — O99.282 ENDOCRINE, NUTRITIONAL AND METABOLIC DISEASES COMPLICATING PREGNANCY, SECOND TRIMESTER: ICD-10-CM

## 2020-09-15 DIAGNOSIS — Z82.49 FAMILY HISTORY OF ISCHEMIC HEART DISEASE AND OTHER DISEASES OF THE CIRCULATORY SYSTEM: ICD-10-CM

## 2020-09-15 DIAGNOSIS — Z80.3 FAMILY HISTORY OF MALIGNANT NEOPLASM OF BREAST: ICD-10-CM

## 2020-09-15 DIAGNOSIS — E05.90 ENDOCRINE, NUTRITIONAL AND METABOLIC DISEASES COMPLICATING PREGNANCY, SECOND TRIMESTER: ICD-10-CM

## 2020-09-15 DIAGNOSIS — Z78.9 OTHER SPECIFIED HEALTH STATUS: ICD-10-CM

## 2020-09-15 LAB
ALBUMIN SERPL ELPH-MCNC: 4.4 G/DL
ALP BLD-CCNC: 136 U/L
ALT SERPL-CCNC: 53 U/L
ANION GAP SERPL CALC-SCNC: 9 MMOL/L
AST SERPL-CCNC: 40 U/L
BILIRUB SERPL-MCNC: 0.4 MG/DL
BUN SERPL-MCNC: 12 MG/DL
CALCIUM SERPL-MCNC: 9.4 MG/DL
CHLORIDE SERPL-SCNC: 103 MMOL/L
CO2 SERPL-SCNC: 28 MMOL/L
CREAT SERPL-MCNC: 0.55 MG/DL
GLUCOSE SERPL-MCNC: 72 MG/DL
POTASSIUM SERPL-SCNC: 4.5 MMOL/L
PROT SERPL-MCNC: 7.3 G/DL
SODIUM SERPL-SCNC: 140 MMOL/L
T3 SERPL-MCNC: 126 NG/DL
T4 FREE SERPL-MCNC: 0.2 NG/DL
TSH SERPL-ACNC: 0.03 UIU/ML

## 2020-09-15 PROCEDURE — 99205 OFFICE O/P NEW HI 60 MIN: CPT | Mod: 95

## 2020-09-15 NOTE — PAST MEDICAL HISTORY
[HIV Infection] : no HIV [Exposure To Gonorrhea] : no gonorrhea [Chlamydial Infections] : no chlamydia [Herpes Simplex] : no genital herpes [Syphilis] : no syphilis [Hepatitis, C Virus] : no Hepatitis C [Hepatitis, B Virus] : no Hepatitis B

## 2020-09-16 PROBLEM — Z31.69 ENCOUNTER FOR PRECONCEPTION CONSULTATION: Status: ACTIVE | Noted: 2020-09-16

## 2020-09-16 NOTE — ACTIVE PROBLEMS
[Diabetes Mellitus] : no diabetes mellitus [Hypertension] : no hypertension [Heart Disease] : no heart disease [Renal Disease] : no kidney disease, no UTI [Neurologic Disorder] : no neurologic disorder, no epilepsy [Psychiatric Disorders] : no psychiatric disorders [Depression] : no depression, no post partum depression [Thrombophlebitis] : no varicosities, no phlebitis [Trauma] : no trauma/violence [Blood Transfusion (___ Ml)] : no history of blood transfusion [FreeTextEntry1] : She has never had a blood transfusion but has no objection to transfusion in case of emergency. \par \par Asthma diagnosed 4 years ago, she has never had a severe attack. She uses Symbicort occasionally and reports symptoms were triggered by her allergies. No hospitalizations or history of intubation. She does not follow with a pulmonologist. \par \par Mother with breast cancer age 39 years, mother BRCA negative. She has a history of breast biopsy performed in 2018 and is due for repeat breast imaging. \par \par PCP: Dr. Calix \par \par \par \par \par

## 2020-09-16 NOTE — OB HISTORY
[Pregnancy History] : Vaginal delivery [[facility]] : [facility] [___] : no pregnancy complications reported

## 2020-09-16 NOTE — REVIEW OF SYSTEMS
[Easy Bruising] : easy bruising [Hot Flashes] : hot flashes [Sleep Disturbances] : sleep disturbances [Abn Vag Bleeding] : no abnormal vaginal bleeding [Muscle Weakness] : no muscle weakness [Easy Bleeding] : does not bleed easily

## 2020-09-16 NOTE — DISCUSSION/SUMMARY
[FreeTextEntry1] : Her problems and my suggestions for her management are summarized below.  She was extensively counseled regarding the following issues:\par \par \par Graves disease with history of 18-week fetal loss:\par \par Pregnancy complicated by poorly controlled hyperthyroidism is at increased risk of stillbirth, premature labor, spontaneous , low birth weight, preeclampsia, and maternal cardiac complications. The risk of these complications can be decreased with treatment. One to 5 percent of neonates born to women with Graves' disease have hyperthyroidism due to transplacental transfer of TSH receptor-stimulating antibodies. The incidence is higher in women with high titers of these antibodies. I explained that even with medical treatment or definitive therapy, antibody passage can still occur and contribute to fetal or  hyper or hypothyroidism. \par \par The best and safest option for Ms. Rubin would be to have definitive therapy before her next pregnancy. She will continue to follow with the [facility] Endocrinology team and Dr. Olivarez, and desires to pursue pregnancy in the next 1 - 2 years, and so I discussed with her that completing definitive therapy prior to pregnancy is reasonable to achieve within this timeline. \par \par If Ms. Rubin does not elect for definitive therapy, thionamide therapy with propylthiouracil (PTU) in the preconception period and first trimester and methimazole starting in the second trimester is effective in maintaining a maternal euthyroid state. There are higher maternal hepatotoxicity profiles for PTU compared with methimazole, but there is concern for methimazole embryopathy causing cutis aplasia, choanal atresia, tracheoesophageal fistula, and facial anomalies if the fetus is exposed in the first trimester. Both medications cross the placenta. Maternal side effects of PTU or methimazole treatment can also include rash, pruritus, drug-related fever, hepatitis, a lupus-like syndrome, and bronchospasm. Agranulocytosis is a rare but serious side effect that would require stopping therapy. Beta blockers are safe for the control of adrenergic symptoms and maternal tachycardia. PTU and Methimazole do not appear to affect somatic or intellectual growth in children exposed to it during lactation and are considered safe for breastfeeding mothers.\par \par \par Fetal echocardiogram report and initial ultrasound reports from outside hospital were suspicious for uterine myoma. I suspect that Ms. Rubin's very large and globular placenta was interpreted as a myoma in pregnancy in her case. A repeat pelvic ultrasound to confirm can be considered. \par \par \par \par History of breast biopsy with ductal hyperplasia:\par \par A referral was given to Ms. Rubin for repeat mammogram. I encouraged her to follow up with her breast surgeon pending these results. Dr. Mccallum has since transferred to a different system but Ms. Rubin may establish care with the [facility] team if she desires. \par \par \par \par \par This consultation was performed via telehealth using [employer] video chat with real-time 2-way audio visual technology. Ms. Rubin provided verbal consent to use the application at the time of consultation. She was physically present in her home and I was physically present off site. No other people were present for or participated in the consultation. At the end of our visit, the patient indicated that her questions were answered and she seemed satisfied with our discussion. Approximately 60 minutes were spent with the patient on video chat with an additional 10 minutes spent for coordination of care.  Please do not hesitate to contact with any questions.\par

## 2020-09-16 NOTE — DATA REVIEWED
[FreeTextEntry1] : 2018 breast excisional biopsies:\par Fibroadenoma\par Apocrine metaplasia and unusual ductal hyperplasia\par Pseudoangiomatous stromal hyperplasia\par Apocrine metaplasia\par Calcifications associated with benign epithelium\par Benign breast tissue with fibrocystic changes including cysts, unusual ductal hyperplasia and apocrine metaplasia\par \par \par Surgical Final Report\par \par \par \par \par Final Diagnosis\par \par 1. Placenta (88 grams; 18 weeks), Vaginal delivery\par - Second trimester placenta\par - Intervillous thrombi and infarcts involving approximately\par 20-30% of disk\par - Trivascular umbilical cord, no pathologic alteration\par - Chorioamniotic membranes, no pathologic alteration\par \par 2. Fetus\par - Body weight 158 grams average for 17-18 weeks gestational\par age\par - Crown-heel length 20.8 cm, average for gestational age of\par 19-20 weeks\par - Crown-rump length 15.0 cm, average for gestational age of\par 20 weeks\par - Maceration with skin slippage\par - Absence of identifiable external developmental\par malformations\par - Fetal organs with normal development for age\par \par Note:\par The placental weight lies above the 50th percentile (80 g) for\par gestational age.\par \par Fetal tissues examined histologically include lung, heart, liver,\par pancreas, kidney, gastrointestinal tract, and thymus.  All\par tissues appear appropriate for gestational age.\par \par The placental findings of infarction and intervillous thrombus\par formation suggest maternal vascular insufficiency as the cause of\par fetal demise in this\par case. Results of cytogenetic studies are to be reported\par separately.\par \par Verified by: Shara Kelley M.D., [facility]\par (Electronic Signature)\par Reported on: 20 16:24 EDT, [facility], [address], [place] [address]\par Phone: [phone]   Fax: [phone]\par _________________________________________________________________\par \par Clinical History\par Gestational age: 18 weeks\par Delivery method: Vaginal delivery\par Apgar score: 6 and 8\par Obstetric history: Hyperthyroidism\par Antepartum complications: Fetal demise, Oligohydramnios\par \par \par \par \par \par DOCKERY, KONG R                      4\par \par \par \par Surgical Final Report\par \par \par \par \par Intrapartum complications: Chorioamnionitis\par \par Specimen(s) Submitted\par 1-Placenta\par 2-Fetus\par \par Gross Description\par 1. The specimen is received in formalin and the specimen\par container is labeled: Placenta.  It consists of a high\par placenta with attached membranes and umbilical cord.  The\par findings are as follows:\par \par Extraplacental membranes:\par Completeness:        cannot be assessed due to disruption\par Color:               pink-tan/green\par Appearance:          translucent\par Insertion:           marginal\par Rupture site:        cannot be assessed due to disruption\par Umbilical cord:\par Length:         10 cm\par Diameter:       0.5 cm\par Color:               tan/green\par # Vessels:           3\par Knots:               True knot absent\par Insertion:           paramarginal\par 1.5 cm from the edge of the disc\par Placental disc:\par Shape:               round\par Weight:                88 grams (trimmed)\par Dimensions:          8.0 x 7.5x 2.5 cm\par Accessory lobe:      not present\par Fetal surface:       blue-gray, smooth, and glistening\par Vessels:        diffuse pattern of arborization from cord\par insertion site\par Maternal surface:    disrupted with complete cotyledons\par Parenchyma:          dark red, spongy\par Lesions:        Numerous white firm areas are present in\par approximately\par 20-30% of parenchyma ranging from 0.3 to 1.3 cm\par Retroplacental clot:      not present\par Free clot:           not present\par \par Representative sections are submitted in 5 cassettes.\par 1A: Sections of cord\par 1B: Sections of membrane roll including amnion and chorion\par 1C: Representative section, full thickness from center of disc\par with white lesion\par \par \par \par \par \par \par KONG DOCKERY R                      4\par \par \par \par Surgical Final Report\par \par \par \par \par 1D: Representative section, full thickness from eccentric\par location in disc with white lesion\par 1E: Representative section, ful thickness without lesion (added\par section 2020, [facility])\par \par [facility]\par 20 18:05\par \par 2.  The specimen is received unfixed and the specimen container\par is labeled: Fetus.The fetus consists of an intact, phenotypically\par female fetus (Weight: 158.0 g; Crown-heel length: 20.8 cm: Crown-\par rump length: 15.0 cm; Foot length: 2.5 cm).  The skin is pink-\par red, focally congested, and there is moderate skin slippage. The\par cranial cavity is intact, and the calvarial bones are not\par overlapping one another. The fetus appears well-developed, with\par two eyes having fused lids, a small anteverted nose with patent\par nares, an intact lip and palate, a slightly recessed chin, two\par normally-formed appropriately-placed ears, two nipple buds, and\par the sternum ending nursing home between the manubrium and the\par umbilicus. There are no abnormal contractures or joint\par disarticulations, and nuchal thickening is not appreciated.\par There are four well-formed extremities, each with five\par unremarkable digits. The back shows no abnormality. The anus is\par patent. The labioscrotal folds are unfused, and a clitoris and\par patent vagina are present, consistent with female gender. A\par segment of umbilical cord is attached to the fetus with scanty\par amount of loosely adherent blood clot (8.4 cm long x 0.4 cm wide,\par 3 vessels, one clamp).\par \par A standard Y-incision is made. The pleural, pericardial, and\par peritoneal cavities do not contain abnormal fluid collections.\par The thymus resides in the anterior mediastinum. The in situ\par examination reveals normal situs with the heart apex pointing\par down and to the left, the stomach and single spleen in the upper\par left abdomen, the liver and gallbladder in the upper right\par abdomen, and the appendix in the lower right abdomen. The right\par and left hemidiaphragms are intact. A uterus, two fallopian\par tubes, and two ovaries are present in the pelvis.\par \par The heart chambers and great vessels demonstrate the usual\par external morphologies and anatomic relationships. The right and\par left lungs have three and two lobes, respectively. The liver is\par homogeneous. The spleen is unremarkable. The kidneys have normal\par fetal lobulations and the renal pelves and single ureters are not\par dilated. The adrenal glands are unremarkable. The bowel is not\par distended and it contains green meconium.\par \par Photographs are taken.\par \par \par \par \par \par KONG DOCKERY                      4\par \par \par \par Surgical Final Report\par \par \par \par \par Tissue has been submitted for cytogenetic analysis from placenta\par and cord (per requisition form).\par Representative sections are submitted in 4 cassettes as follows:\par A. sections of thymus, heart, liver and spleen\par B. sections of gallbladder, stomach, pancreas, intestines,\par appendix, uterus and bladder\par C. sections of left lung, adrenal gland, kidney and ovary\par D. sections of right lung, adrenal gland, kidney and ovary\par \par In addition to other data that may appear on the specimen\par containers, all labels have been inspected to confirm the\par presence of the patient's name and date of birth.\par \par Mago Remy 2020 10:01\par

## 2020-09-16 NOTE — RESULTS/DATA
[FreeTextEntry1] : July 2020 labs:\par O pos, antibody screen negative. \par Total T3 494.4 ng/dL (elevated)\par Serum T4 24.86 ug/dL (elevated)\par TSH < 0.10 uIU/mL\par AST/ALT 33/26 u/L\par WBC 8.66 K/uL\par Thyroglobulin antibodies 2056.0 IU/mL (elevated)\par Thyroperoxidase antibodies 1090.0 IU/mL (elevated)\par Thyroid stimulating antibodies 844.0 IU/L (elevated)\par Free Thyroxine 7.77 ng/dL (elevated)\par \par \par Placental cultures 7/6/2020:\par Rare propionibacterium acnes on maternal and fetal sides, susceptibilities not performed.  [FreeTextEntry2] : EXAM:  US THYROID PARATHYROID  \par \par PROCEDURE DATE:  Jul 5 2020 \par \par INTERPRETATION:  CLINICAL INFORMATION: pregnant female with acute hyperthyroidism.\par \par COMPARISON: None available.\par \par TECHNIQUE: Sonography of the thyroid. \par \par FINDINGS:\par Right Lobe: 6 x 2.9 x 2.5 cm.\par \par Left Lobe: 6.2 x 3.1 x 3 cm.\par \par Isthmus: 7 mm. \par \par Heterogeneous and hyperemic ring, without discrete nodule consistent with Graves' disease versus thyroiditis\par \par Cervical Lymph Nodes: No enlarged or abnormal morphology cervical nodes.\par \par IMPRESSION: \par Goiter with appearance consistent with Graves' disease versus thyroiditis\par \par

## 2020-09-16 NOTE — HISTORY OF PRESENT ILLNESS
[FreeTextEntry1] : Thank you for referring Ms. Mary Rubin for Maternal Fetal Medicine preconception consultation. She is a 34 year old  LMP 2020 with Graves disease and a history of recent pregnancy loss at 18 weeks gestational age complicated by severe maternal hyperthyroidism and resultant fetal tachycardia and  PROM. \par \par Ms. Rubin was diagnosed with Graves disease in 2020 and was treated with propanolol by Dr. Elaine. She has since transitioned care to Dr. Olivarez and is currently treated with 10mg methimazole twice daily. \par \par She is feeling well overall, she has noticed heat intolerance (especially in the sun or in bright lights), brittle nails and hair loss increasing over the past 2 weeks. She has been having trouble sleeping since 2020. She is still taking propanolol 20mg twice daily. \par \par She has started exercising again and is able to play her son more. She reports improvement overall in her fatigue and muscle and joint pains. Her mood is overall improved and although she is experiencing grief, she has a strong support system at home.\par \par \par \par  [Patient travelled to an area with active Zika Virus transmission in the last 6 months] : Patient is trying to get pregnant and she did not travel to an area with active Zika virus transmission in the last 6 months. [Patient's partner travelled to an area with active Zika Virus transmission in the last 6 months] : Patient's partner did not travel to an area with active Zika Virus transmission in the last 6 months

## 2020-09-16 NOTE — SURGICAL HISTORY
[Breast Disease] : breast disease [Last Mammo: ___] : Last Mammo: [unfilled] [Fibroids] : no fibroids [Abn Paps] : no abnormal pap smears [Infertility] : no infertility

## 2020-12-16 ENCOUNTER — APPOINTMENT (OUTPATIENT)
Dept: ENDOCRINOLOGY | Facility: CLINIC | Age: 35
End: 2020-12-16
Payer: COMMERCIAL

## 2020-12-16 VITALS
BODY MASS INDEX: 27.47 KG/M2 | SYSTOLIC BLOOD PRESSURE: 110 MMHG | HEART RATE: 67 BPM | DIASTOLIC BLOOD PRESSURE: 68 MMHG | WEIGHT: 186 LBS

## 2020-12-16 PROCEDURE — 99072 ADDL SUPL MATRL&STAF TM PHE: CPT

## 2020-12-16 PROCEDURE — 99213 OFFICE O/P EST LOW 20 MIN: CPT

## 2021-01-07 NOTE — PHYSICAL EXAM
[Alert] : alert [Healthy Appearance] : healthy appearance [Normal Hearing] : hearing was normal [No LAD] : no lymphadenopathy [Clear to Auscultation] : lungs were clear to auscultation bilaterally [Normal S1, S2] : normal S1 and S2 [Regular Rhythm] : with a regular rhythm [No Lid Lag] : no lid lag [No Tremors] : no tremors [Normal Affect] : the affect was normal [Normal Mood] : the mood was normal [de-identified] : very large goiter, (+) bruit

## 2021-01-07 NOTE — ADDENDUM
[FreeTextEntry1] : labs reviewed with patient, 1/20: TSH 0.02, T3 427, tot T4, 17.8, KONSTANTIN neg, RF neg

## 2021-01-07 NOTE — DATA REVIEWED
[FreeTextEntry1] : \par 9/20: TSH 0.03, T4 126, free T4 0.2\par 7/27/20: T3 323, free T4 1.8, \par 7/5/20: T4 24.86, free T4 3.9, T3 284.8\par 7/3/20: T4 19.33, free T4 7.6, T3 374.3, TPO 1090, TSh Rec Ab 321

## 2021-01-07 NOTE — ASSESSMENT
[FreeTextEntry1] : Graves\par continue methimazole.  Discussed referring for surgery, which would not be urgent, but I would recommend thyroidectomy prior to next pregnancy.  Normally, I would consider BOSWELL but since she has ophthalmopathy, would prefer surgery\par send for thyroid sono to evaluate goiter (send again)\par check KONSTANTIN, RF, CK for muscle and joint aches.  will refer to rheum if these are abnormal.\par RTO 3 months

## 2021-03-12 ENCOUNTER — APPOINTMENT (OUTPATIENT)
Dept: ENDOCRINOLOGY | Facility: CLINIC | Age: 36
End: 2021-03-12
Payer: COMMERCIAL

## 2021-03-12 VITALS
BODY MASS INDEX: 28.5 KG/M2 | WEIGHT: 193 LBS | SYSTOLIC BLOOD PRESSURE: 116 MMHG | HEART RATE: 84 BPM | DIASTOLIC BLOOD PRESSURE: 60 MMHG

## 2021-03-12 PROCEDURE — 99213 OFFICE O/P EST LOW 20 MIN: CPT

## 2021-03-12 PROCEDURE — 99072 ADDL SUPL MATRL&STAF TM PHE: CPT

## 2021-03-12 NOTE — ASSESSMENT
[FreeTextEntry1] : Graves\par continue methimazole.  She will do labs at [facility].  Will review labs with patient after visit. \par Surgery referral given (Dr Coley) since she does not seem to be responding optimally to medication and  I worry that  radioactive iodine may worsen eye disease \par Thyrod sono referral given again, but Dr Coley could also likely do at his office, if she sees him.\par RTO 2-3 months

## 2021-03-12 NOTE — PHYSICAL EXAM
[Alert] : alert [Healthy Appearance] : healthy appearance [No Lid Lag] : no lid lag [Normal Hearing] : hearing was normal [No LAD] : no lymphadenopathy [Clear to Auscultation] : lungs were clear to auscultation bilaterally [Normal S1, S2] : normal S1 and S2 [Regular Rhythm] : with a regular rhythm [No Tremors] : no tremors [Normal Affect] : the affect was normal [Normal Mood] : the mood was normal [de-identified] : very large goiter, nodular, soft

## 2021-03-12 NOTE — HISTORY OF PRESENT ILLNESS
[FreeTextEntry1] : here with 2 year old son\par feeling hot on and off, doesn't feel better\par skin is dry and still has palpitations.  Also gets thirsty and nauseous.\par has been craving caffeine and then feels worse\par using eye drops helps eye symptoms (dryness, grittiness).  R eye feels it's bulging out\par got Covid vaccine\par \par Meds:\par methimazole 10mg  BID\par propranolol bid\par selenium, prenatal vitamin\par

## 2021-03-12 NOTE — DATA REVIEWED
[FreeTextEntry1] : 1/21, TSH 0.02, T3 427. T4 17.8\par 9/20: TSH 0.03, T4 12.6, free T4 0.2\par 7/27/20: T3 323, free T4 1.8, \par 7/5/20: T4 24.86, free T4 3.9, T3 284.8\par 7/3/20: T4 19.33, free T4 7.6, T3 374.3, TPO 1090, TSh Rec Ab 321

## 2021-05-10 ENCOUNTER — APPOINTMENT (OUTPATIENT)
Dept: ENDOCRINOLOGY | Facility: CLINIC | Age: 36
End: 2021-05-10
Payer: COMMERCIAL

## 2021-05-10 VITALS
BODY MASS INDEX: 29.18 KG/M2 | WEIGHT: 197 LBS | DIASTOLIC BLOOD PRESSURE: 60 MMHG | SYSTOLIC BLOOD PRESSURE: 111 MMHG | HEIGHT: 69 IN | HEART RATE: 68 BPM

## 2021-05-10 PROCEDURE — 99072 ADDL SUPL MATRL&STAF TM PHE: CPT

## 2021-05-10 PROCEDURE — 99213 OFFICE O/P EST LOW 20 MIN: CPT

## 2021-05-10 NOTE — DATA REVIEWED
[FreeTextEntry1] : 4/21: TSH < 0.01, free T4 1.1, T3 184, , TSH Rec Ab 36.34\par 1/21, TSH 0.02, T3 427. T4 17.8\par 9/20: TSH 0.03, T4 12.6, free T4 0.2\par 7/27/20: T3 323, free T4 1.8, \par 7/5/20: T4 24.86, free T4 3.9, T3 284.8\par 7/3/20: T4 19.33, free T4 7.6, T3 374.3, TPO 1090, TSh Rec Ab 321

## 2021-05-10 NOTE — HISTORY OF PRESENT ILLNESS
[FreeTextEntry1] : not having palpitations as much, doesn't feel that she needs propranolol (did not take today), but she also reduced caffeine intake\par feeling hot and cold\par no constipation\par did not see surgeon yet, no time to go for appointment \par sleeping ok, depends on her son\par nails dry and cracking still\par hair loss resolved (just normal hair loss now)\par periods not regular since she had her son\par eyes are stable but waterry and gritty; also has allergies.\par more anxious and jumpy but may be due to going out, after being home for so long\par \par PMH: Graves\par  early menopause, in early 40s\par \par Meds:\par methimazole 10mg daily\par propranolol -- didn't take today\par selenium, prenatal vitamin\par

## 2021-05-10 NOTE — ASSESSMENT
[FreeTextEntry1] : Graves\par continue methimazole.  will adjust dose, if needed, based on TFTs\par OK to stop propranolol but advised that she should stay on methimazole, otherwise, she may revert back to hyperthyroidism\par Advised to make appointment to see Dr Coley and he can do thyroid sono in his office.\par Eyes will hopefully improve after thyroidectomy because thyroid antibodies normally decrease after surgery.\par RTO 3 months

## 2021-05-10 NOTE — PHYSICAL EXAM
[Alert] : alert [Healthy Appearance] : healthy appearance [No Lid Lag] : no lid lag [Normal Hearing] : hearing was normal [No LAD] : no lymphadenopathy [Clear to Auscultation] : lungs were clear to auscultation bilaterally [Normal S1, S2] : normal S1 and S2 [Regular Rhythm] : with a regular rhythm [No Tremors] : no tremors [Normal Affect] : the affect was normal [Normal Mood] : the mood was normal [Acanthosis Nigricans] : no acanthosis nigricans [de-identified] : very large goiter, nodular, soft

## 2021-05-17 ENCOUNTER — APPOINTMENT (OUTPATIENT)
Dept: OTOLARYNGOLOGY | Facility: CLINIC | Age: 36
End: 2021-05-17
Payer: COMMERCIAL

## 2021-05-17 VITALS
SYSTOLIC BLOOD PRESSURE: 114 MMHG | WEIGHT: 197 LBS | HEIGHT: 69 IN | DIASTOLIC BLOOD PRESSURE: 74 MMHG | TEMPERATURE: 95.7 F | HEART RATE: 71 BPM | OXYGEN SATURATION: 96 % | BODY MASS INDEX: 29.18 KG/M2

## 2021-05-17 DIAGNOSIS — Z87.09 PERSONAL HISTORY OF OTHER DISEASES OF THE RESPIRATORY SYSTEM: ICD-10-CM

## 2021-05-17 DIAGNOSIS — Z78.9 OTHER SPECIFIED HEALTH STATUS: ICD-10-CM

## 2021-05-17 DIAGNOSIS — Z83.49 FAMILY HISTORY OF OTHER ENDOCRINE, NUTRITIONAL AND METABOLIC DISEASES: ICD-10-CM

## 2021-05-17 DIAGNOSIS — Z82.49 FAMILY HISTORY OF ISCHEMIC HEART DISEASE AND OTHER DISEASES OF THE CIRCULATORY SYSTEM: ICD-10-CM

## 2021-05-17 PROCEDURE — 31575 DIAGNOSTIC LARYNGOSCOPY: CPT

## 2021-05-17 PROCEDURE — 99072 ADDL SUPL MATRL&STAF TM PHE: CPT

## 2021-05-17 PROCEDURE — 99205 OFFICE O/P NEW HI 60 MIN: CPT | Mod: 25

## 2021-05-17 PROCEDURE — 76536 US EXAM OF HEAD AND NECK: CPT

## 2021-05-17 RX ORDER — FLUTICASONE PROPIONATE 50 MCG
SPRAY, SUSPENSION NASAL
Refills: 0 | Status: ACTIVE | COMMUNITY

## 2021-05-17 NOTE — REASON FOR VISIT
[FreeTextEntry2] : Graves' disease and hyperthyroidism for surgical consultation. [FreeTextEntry1] : Referred by endocrinologist Nataly Olivarez MD, PCP is Tino Calix MD

## 2021-05-17 NOTE — CONSULT LETTER
[Dear  ___] : Dear  [unfilled], [Consult Letter:] : I had the pleasure of evaluating your patient, [unfilled]. [Please see my note below.] : Please see my note below. [Consult Closing:] : Thank you very much for allowing me to participate in the care of this patient.  If you have any questions, please do not hesitate to contact me. [Sincerely,] : Sincerely, [FreeTextEntry3] : \par Bassem Coley M.D., FACS, ECNU\par Director Center for Thyroid & Parathyroid Surgery\par [facility]\par Certified in Thyroid/Parathyroid/Neck Ultrasound, ECNU/ AIUM\par \par , Department of Otolaryngology\par [employer] [facility]\par

## 2021-05-17 NOTE — HISTORY OF PRESENT ILLNESS
[FreeTextEntry1] :  [facility] THYROID/NECK ULTRASOUND REPORT  NAME: [LAST, FIRST] .....           MR# [xxxxxxxx].....	              : [xx/xx/xx].....	         DATE: []  HISTORY/ INDICATIONS: [A 48 year-old female with a 10 year history of hypothyroidism on thyroid hormone replacement and living in [place] in the  with possible radiation exposure to the [place] nuclear fallout].    COMPARISON: [None]  PROCEDURE: Physician performed high-resolution ultrasound gray scale imaging and color Doppler supplementation of the thyroid gland and neck was obtained in the longitudinal and transverse planes using a 13 MHz linear transducer with image capture.  All measurements are in centimeters (longitudinal x AP x transverse).    FINDINGS: Overall the thyroid gland is normal in size, heterogeneous in echotexture with normal vascularity on color Doppler flow.   RIGHT LOBE: Is not enlarged, heterogeneous, with normal vascularity on color Doppler and measures [x.xx x x.xx x x.xx] cm.  NODULES: Within the [mid-to-upper-posterior right lobe], a hypoechoic, heterogeneous, well-marginated, solid nodule with grade 1 vascularity is identified and measures [x.xx x x.xx x x.xx] cm. No echogenic foci are present to suggest microcalcifications.   ISTHMUS: Measures [0.44] cm in AP dimension and is [heterogeneous] in echotexture with normal vascularity.  No nodules are identified.  LEFT LOBE: Is not enlarged, heterogeneous, with normal vascularity on color Doppler and measures [x.xx x x.xx x x.xx]  cm. NODULES:Within the [mid-to-upper-posterior right lobe], a hypoechoic, heterogeneous, well-marginated, solid nodule with grade 1 vascularity is identified and measures [x.xx x x.xx x x.xx] cm. No echogenic foci are present to suggest microcalcifications  PARATHYROID GLANDS: There are no identified enlarged parathyroid glands in the central neck compartment.   LYMPH NODES: There are several benign appearing subcentimeter lymph nodes identified at neck levels II- III bilaterally (lateral neck), all with echogenic hilar lines, no calcifications or cystic degeneration and have a short long axis ratio < 0.5 in the transverse plane.  There are no enlarged or abnormal appearing central compartment, level VI lymph nodes.  IMPRESSION: [48-year-old female] with a small, hypoechoic and heterogeneous thyroid gland with multiple echogenic bands and a probable right lobe subcentimeter pseudonodule consistent with Hashimoto's thyroiditis.    RECOMMENDATIONS: Repeat thyroid ultrasound in 1 year as well as continued monitoring of TSH.   Electronically signed by Bassem Coley MD on [xx/xx/xx],  [xx:xx PM]  [facility]: [address], [address], [place] [address] [phone] (voice), [phone] (fax)  [de-identified] : Mary is a generally healthy 35-year-old female  for " Early Intervention."  She was first noted to have an enlarged thyroid gland at the age of 20 and given the diagnosis of Hashimoto's in her 20's.  She became hypothyroid in  and began taking thyroid hormone and 2 months later became pregnant with her son born full term by .  Her son is healthy and ~ 2 years old. She became hyperthyroid ~ 6 months after she delivered and became progressively more symptomatic with hair loss, brittle nails, tachycardia, weight loss, severe fatigue, joint pain, heat intolerance and stopped thyroid hormone.  She became pregnant again in 2020 but miscarried at 18 weeks of gestation.  At the end of 2020 she started antithyroid meds (Methimazole 5 mg) increased to 20 mg daily along with propranolol.  After her miscarriage she was referred to another Endocrinologist and noted to have elevated TSH receptor Ab 296.0 IU/L, ( ULN 1.75) and TSI elevated to 667.00 IU/L (ULN 0.55).  TSH was suppressed and she continued Methimazole but decreased dose 2 months ago with some difficulty maintaining a euthyroid state. Her labs last month indicated near euthyroidism with T4 of 1.1, T3 total 184 (), TSH < 0.01. She has dry eyes, lagophthalmos and fluctuating increased IOP but minimal proptosis. Mary denies recent shortness of breath, voice changes, dysphagia, anterior neck pain, neck pressure or mass. She has not had an ultrasound since last year but stated the gland was enlarged and caused difficulty breathing at night when lying flat in bed. There is no family history of thyroid cancer.  A paternal GM has thyroid disease of uncertain etiology. She denies any known radiation exposures in her youth.

## 2021-05-17 NOTE — PROCEDURE
[Image(s) Captured] : image(s) captured and filed [Gag Reflex] : gag reflex preventing mirror examination [Topical Lidocaine] : topical lidocaine [Oxymetazoline HCl] : oxymetazoline HCl [Flexible Endoscope] : examined with the flexible endoscope [Serial Number: ___] : Serial Number: [unfilled] [FreeTextEntry3] : \par [facility]\par THYROID/NECK ULTRASOUND REPORT\par \par NAME: KONG DOCKERY.....           MR# [id number].....	              : 1985.....	         DATE: 2021\par \par HISTORY/ INDICATIONS: A 35-year-old female with thyromegaly and Graves' disease for preoperative evaluation.  \par \par COMPARISON: None\par \par PROCEDURE: Physician performed high-resolution ultrasound gray scale imaging and color Doppler supplementation of the thyroid gland and neck was obtained in the longitudinal and transverse planes using a 13 MHz linear transducer with image capture.  All measurements are in centimeters (longitudinal x AP x transverse).  \par \par FINDINGS: Overall the thyroid gland is enlarged, markedly heterogeneous in echotexture with increased vascularity on color Doppler flow, hypoechoic and with numerous echogenic linear striations consistent with autoimmune thyroiditis.  A subcentimeter hyperechoic nodule is identified in the left lateral mid lobe.\par \par RIGHT LOBE: Is enlarged, markedly heterogeneous, with increased vascularity on color Doppler and measures 6.99 x 2.23 x 3.41 cm.  NODULES: None. \par \par ISTHMUS: Measures 0.74 cm in AP dimension and is heterogeneous in echotexture with normal vascularity.  No nodules are identified.\par \par LEFT LOBE: Is enlarged, markedly heterogeneous, with increased vascularity on color Doppler and measures 5.86 x 1.58 x 2.56 cm. NODULES: Within the left mid to lower lobe laterally, a hyperechoic, heterogeneous, well- marginated, solid nodule with grade 1 vascularity is identified and measures 0.81 x 0.47 x 0.70 cm. No echogenic foci are present to suggest microcalcifications.\par \par PARATHYROID GLANDS: There are no identified enlarged parathyroid glands in the central neck compartment. \par \par LYMPH NODES: There are several benign appearing subcentimeter lymph nodes identified at neck levels II- III bilaterally (lateral neck), all with echogenic hilar lines, no calcifications or cystic degeneration and have a short long axis ratio < 0.5 in the transverse plane.  There are no enlarged or abnormal appearing central compartment, level VI lymph nodes.\par \par IMPRESSION: A 35-year-old female with an enlarged, hypoechoic and heterogeneous thyroid gland with multiple echogenic bands, upper vascularity and a probable left mid to lower lobe hyperechoic subcentimeter pseudonodule consistent with autoimmune thyroiditis.  \par \par RECOMMENDATIONS: Surgical treatment is not contemplated then another thyroid ultrasound in 1 year as well as continued monitoring of TSH. \par \par Electronically signed by Bassem Coley MD on 2021, 10:55 AM\par \par [facility]: [address], [address], [place] [address]\par [phone] (voice), [phone] (fax) [de-identified] : The nasal septum is minimally deviated to the left. There are no masses or polyps and the nasal mucosa and secretions are normal. The choanae and posterior nasopharynx are normal without masses or drainage. The Eustachian tube orifices appear patent. The pharynx, including the posterior and lateral pharyngeal walls, the vallecula and base of tongue are normal without ulcerations, lesions or masses. The hypopharynx including the pyriform sinuses open well without pooling of secretions, mucosal lesions or masses. The supraglottic larynx including the epiglottis, petiole, arytenoids, glossoepiglottic, aryepiglottic and pharyngoepiglottic folds are normal without mucosal lesions, ulcerations or masses. The glottis reveals normal false vocal folds. The true vocal folds are glistening white, tense and of equal length, without paralysis, having symmetric mobility on adduction and abduction. There are no mucosal lesions, nodules, cysts, erythroplasia or leukoplakia. The posterior cricoid area has healthy pink mucosa in the interarytenoid area and esophageal inlet. There is mild  thickening/edema of the interarytenoid mucosa suggestive of posterior laryngitis from laryngopharyngeal acid reflux disease. The trachea is clear without narrowing in the immediate subglottic region, without deviation or lesions.

## 2021-05-20 RX ORDER — POTASSIUM IODIDE 1 G/ML
1 SOLUTION ORAL DAILY
Qty: 1 | Refills: 0 | Status: DISCONTINUED | COMMUNITY
Start: 2021-05-18 | End: 2021-05-20

## 2021-06-15 ENCOUNTER — TRANSCRIPTION ENCOUNTER (OUTPATIENT)
Age: 36
End: 2021-06-15

## 2021-06-15 NOTE — ASU PATIENT PROFILE, ADULT - PMH
Allergy  seasonal  Graves disease    Hashimoto's disease    Mild intermittent asthma without complication

## 2021-06-15 NOTE — ASU PATIENT PROFILE, ADULT - PSH
Cyst of breast  2 separate surgeries  left & right breast  History of appendectomy    S/P cholecystectomy

## 2021-06-16 ENCOUNTER — RESULT REVIEW (OUTPATIENT)
Age: 36
End: 2021-06-16

## 2021-06-16 ENCOUNTER — APPOINTMENT (OUTPATIENT)
Dept: OTOLARYNGOLOGY | Facility: HOSPITAL | Age: 36
End: 2021-06-16

## 2021-06-16 ENCOUNTER — OUTPATIENT (OUTPATIENT)
Dept: OUTPATIENT SERVICES | Facility: HOSPITAL | Age: 36
LOS: 1 days | Discharge: HOME | End: 2021-06-16
Payer: COMMERCIAL

## 2021-06-16 VITALS
DIASTOLIC BLOOD PRESSURE: 68 MMHG | WEIGHT: 202.16 LBS | RESPIRATION RATE: 16 BRPM | OXYGEN SATURATION: 99 % | HEIGHT: 69 IN | HEART RATE: 69 BPM | SYSTOLIC BLOOD PRESSURE: 108 MMHG | TEMPERATURE: 98 F

## 2021-06-16 DIAGNOSIS — Z90.49 ACQUIRED ABSENCE OF OTHER SPECIFIED PARTS OF DIGESTIVE TRACT: Chronic | ICD-10-CM

## 2021-06-16 DIAGNOSIS — N60.09 SOLITARY CYST OF UNSPECIFIED BREAST: Chronic | ICD-10-CM

## 2021-06-16 LAB
ALBUMIN SERPL ELPH-MCNC: 3.6 G/DL — SIGNIFICANT CHANGE UP (ref 3.3–5)
ALP SERPL-CCNC: 68 U/L — SIGNIFICANT CHANGE UP (ref 40–120)
ALT FLD-CCNC: 26 U/L — SIGNIFICANT CHANGE UP (ref 10–45)
ANION GAP SERPL CALC-SCNC: 8 MMOL/L — SIGNIFICANT CHANGE UP (ref 5–17)
AST SERPL-CCNC: 33 U/L — SIGNIFICANT CHANGE UP (ref 10–40)
BILIRUB SERPL-MCNC: 0.5 MG/DL — SIGNIFICANT CHANGE UP (ref 0.2–1.2)
BUN SERPL-MCNC: 9 MG/DL — SIGNIFICANT CHANGE UP (ref 7–23)
CALCIUM SERPL-MCNC: 7.9 MG/DL — LOW (ref 8.4–10.5)
CHLORIDE SERPL-SCNC: 105 MMOL/L — SIGNIFICANT CHANGE UP (ref 96–108)
CO2 SERPL-SCNC: 20 MMOL/L — LOW (ref 22–31)
CREAT SERPL-MCNC: 0.49 MG/DL — LOW (ref 0.5–1.3)
GLUCOSE SERPL-MCNC: 162 MG/DL — HIGH (ref 70–99)
HCT VFR BLD CALC: 38.4 % — SIGNIFICANT CHANGE UP (ref 34.5–45)
HGB BLD-MCNC: 13.3 G/DL — SIGNIFICANT CHANGE UP (ref 11.5–15.5)
MAGNESIUM SERPL-MCNC: 1.6 MG/DL — SIGNIFICANT CHANGE UP (ref 1.6–2.6)
MCHC RBC-ENTMCNC: 30.9 PG — SIGNIFICANT CHANGE UP (ref 27–34)
MCHC RBC-ENTMCNC: 34.6 GM/DL — SIGNIFICANT CHANGE UP (ref 32–36)
MCV RBC AUTO: 89.1 FL — SIGNIFICANT CHANGE UP (ref 80–100)
NRBC # BLD: 0 /100 WBCS — SIGNIFICANT CHANGE UP (ref 0–0)
PHOSPHATE SERPL-MCNC: 3.3 MG/DL — SIGNIFICANT CHANGE UP (ref 2.5–4.5)
PLATELET # BLD AUTO: 165 K/UL — SIGNIFICANT CHANGE UP (ref 150–400)
POTASSIUM SERPL-MCNC: 4.2 MMOL/L — SIGNIFICANT CHANGE UP (ref 3.5–5.3)
POTASSIUM SERPL-SCNC: 4.2 MMOL/L — SIGNIFICANT CHANGE UP (ref 3.5–5.3)
PROT SERPL-MCNC: 6.8 G/DL — SIGNIFICANT CHANGE UP (ref 6–8.3)
PTH-INTACT IO % DIF SERPL: 13.2 PG/ML — LOW (ref 15–65)
PTH-INTACT IO % DIF SERPL: 21.7 PG/ML — SIGNIFICANT CHANGE UP (ref 15–65)
PTH-INTACT IO % DIF SERPL: 80.6 PG/ML — HIGH (ref 15–65)
RBC # BLD: 4.31 M/UL — SIGNIFICANT CHANGE UP (ref 3.8–5.2)
RBC # FLD: 12.3 % — SIGNIFICANT CHANGE UP (ref 10.3–14.5)
SODIUM SERPL-SCNC: 133 MMOL/L — LOW (ref 135–145)
WBC # BLD: 8.39 K/UL — SIGNIFICANT CHANGE UP (ref 3.8–10.5)
WBC # FLD AUTO: 8.39 K/UL — SIGNIFICANT CHANGE UP (ref 3.8–10.5)

## 2021-06-16 PROCEDURE — 95865 NEEDLE EMG LARYNX: CPT | Mod: 26

## 2021-06-16 PROCEDURE — 88307 TISSUE EXAM BY PATHOLOGIST: CPT | Mod: 26

## 2021-06-16 PROCEDURE — 38510 BIOPSY/REMOVAL LYMPH NODES: CPT

## 2021-06-16 PROCEDURE — 88305 TISSUE EXAM BY PATHOLOGIST: CPT | Mod: 26

## 2021-06-16 PROCEDURE — 60240 REMOVAL OF THYROID: CPT

## 2021-06-16 PROCEDURE — 76536 US EXAM OF HEAD AND NECK: CPT | Mod: 26

## 2021-06-16 PROCEDURE — 88331 PATH CONSLTJ SURG 1 BLK 1SPC: CPT | Mod: 26

## 2021-06-16 RX ORDER — BENZOCAINE AND MENTHOL 5; 1 G/100ML; G/100ML
1 LIQUID ORAL
Refills: 0 | Status: DISCONTINUED | OUTPATIENT
Start: 2021-06-16 | End: 2021-06-16

## 2021-06-16 RX ORDER — ONDANSETRON 8 MG/1
4 TABLET, FILM COATED ORAL EVERY 6 HOURS
Refills: 0 | Status: DISCONTINUED | OUTPATIENT
Start: 2021-06-16 | End: 2021-06-17

## 2021-06-16 RX ORDER — SODIUM CHLORIDE 9 MG/ML
1000 INJECTION, SOLUTION INTRAVENOUS
Refills: 0 | Status: DISCONTINUED | OUTPATIENT
Start: 2021-06-16 | End: 2021-06-17

## 2021-06-16 RX ORDER — ACETAMINOPHEN 500 MG
1000 TABLET ORAL ONCE
Refills: 0 | Status: COMPLETED | OUTPATIENT
Start: 2021-06-16 | End: 2021-06-16

## 2021-06-16 RX ORDER — BENZOCAINE AND MENTHOL 5; 1 G/100ML; G/100ML
1 LIQUID ORAL
Refills: 0 | Status: DISCONTINUED | OUTPATIENT
Start: 2021-06-16 | End: 2021-06-17

## 2021-06-16 RX ORDER — ONDANSETRON 8 MG/1
4 TABLET, FILM COATED ORAL ONCE
Refills: 0 | Status: COMPLETED | OUTPATIENT
Start: 2021-06-16 | End: 2021-06-16

## 2021-06-16 RX ORDER — PROPRANOLOL HCL 160 MG
1 CAPSULE, EXTENDED RELEASE 24HR ORAL
Qty: 0 | Refills: 0 | DISCHARGE

## 2021-06-16 RX ORDER — BUDESONIDE AND FORMOTEROL FUMARATE DIHYDRATE 160; 4.5 UG/1; UG/1
2 AEROSOL RESPIRATORY (INHALATION)
Refills: 0 | Status: DISCONTINUED | OUTPATIENT
Start: 2021-06-16 | End: 2021-06-17

## 2021-06-16 RX ADMIN — BENZOCAINE AND MENTHOL 1 LOZENGE: 5; 1 LIQUID ORAL at 22:50

## 2021-06-16 RX ADMIN — Medication 400 MILLIGRAM(S): at 23:27

## 2021-06-16 RX ADMIN — Medication 1000 MILLIGRAM(S): at 23:42

## 2021-06-16 RX ADMIN — ONDANSETRON 4 MILLIGRAM(S): 8 TABLET, FILM COATED ORAL at 19:25

## 2021-06-16 NOTE — BRIEF OPERATIVE NOTE - OPERATION/FINDINGS
Total thyroidectomy with laryngeal nerve monitoring and lymph node sampling via 8cm transverse neck incision. Bilateral superior and recurrent laryngeal nerves identified and preserved. Dense adhesions and inflammation of thyroid gland. Left and right upper parathyroid glands identified and preserved; bilateral lower parathyroid glands unable to be identified. Hemostatic at end of case. 10Fr JM left below strap muscles. Strap muscles and platysma closed separately with 3-0 Vicryl running. Closed skin with 4-0 Prolene running.

## 2021-06-17 ENCOUNTER — TRANSCRIPTION ENCOUNTER (OUTPATIENT)
Age: 36
End: 2021-06-17

## 2021-06-17 VITALS
RESPIRATION RATE: 16 BRPM | HEART RATE: 54 BPM | OXYGEN SATURATION: 97 % | TEMPERATURE: 99 F | SYSTOLIC BLOOD PRESSURE: 95 MMHG | DIASTOLIC BLOOD PRESSURE: 60 MMHG

## 2021-06-17 LAB
ALBUMIN SERPL ELPH-MCNC: 3.5 G/DL — SIGNIFICANT CHANGE UP (ref 3.3–5)
ALBUMIN SERPL ELPH-MCNC: 3.5 G/DL — SIGNIFICANT CHANGE UP (ref 3.3–5)
ALP SERPL-CCNC: 59 U/L — SIGNIFICANT CHANGE UP (ref 40–120)
ALP SERPL-CCNC: 61 U/L — SIGNIFICANT CHANGE UP (ref 40–120)
ALT FLD-CCNC: 21 U/L — SIGNIFICANT CHANGE UP (ref 10–45)
ALT FLD-CCNC: 21 U/L — SIGNIFICANT CHANGE UP (ref 10–45)
ANION GAP SERPL CALC-SCNC: 11 MMOL/L — SIGNIFICANT CHANGE UP (ref 5–17)
ANION GAP SERPL CALC-SCNC: 12 MMOL/L — SIGNIFICANT CHANGE UP (ref 5–17)
AST SERPL-CCNC: 22 U/L — SIGNIFICANT CHANGE UP (ref 10–40)
AST SERPL-CCNC: 23 U/L — SIGNIFICANT CHANGE UP (ref 10–40)
BILIRUB SERPL-MCNC: 0.4 MG/DL — SIGNIFICANT CHANGE UP (ref 0.2–1.2)
BILIRUB SERPL-MCNC: 0.5 MG/DL — SIGNIFICANT CHANGE UP (ref 0.2–1.2)
BUN SERPL-MCNC: 7 MG/DL — SIGNIFICANT CHANGE UP (ref 7–23)
BUN SERPL-MCNC: 8 MG/DL — SIGNIFICANT CHANGE UP (ref 7–23)
CALCIUM SERPL-MCNC: 7.4 MG/DL — LOW (ref 8.4–10.5)
CALCIUM SERPL-MCNC: 7.5 MG/DL — LOW (ref 8.4–10.5)
CHLORIDE SERPL-SCNC: 102 MMOL/L — SIGNIFICANT CHANGE UP (ref 96–108)
CHLORIDE SERPL-SCNC: 104 MMOL/L — SIGNIFICANT CHANGE UP (ref 96–108)
CO2 SERPL-SCNC: 23 MMOL/L — SIGNIFICANT CHANGE UP (ref 22–31)
CO2 SERPL-SCNC: 24 MMOL/L — SIGNIFICANT CHANGE UP (ref 22–31)
CREAT SERPL-MCNC: 0.52 MG/DL — SIGNIFICANT CHANGE UP (ref 0.5–1.3)
CREAT SERPL-MCNC: 0.54 MG/DL — SIGNIFICANT CHANGE UP (ref 0.5–1.3)
GLUCOSE SERPL-MCNC: 110 MG/DL — HIGH (ref 70–99)
GLUCOSE SERPL-MCNC: 138 MG/DL — HIGH (ref 70–99)
HCT VFR BLD CALC: 37 % — SIGNIFICANT CHANGE UP (ref 34.5–45)
HGB BLD-MCNC: 12.4 G/DL — SIGNIFICANT CHANGE UP (ref 11.5–15.5)
MAGNESIUM SERPL-MCNC: 2 MG/DL — SIGNIFICANT CHANGE UP (ref 1.6–2.6)
MAGNESIUM SERPL-MCNC: 2.2 MG/DL — SIGNIFICANT CHANGE UP (ref 1.6–2.6)
MCHC RBC-ENTMCNC: 30 PG — SIGNIFICANT CHANGE UP (ref 27–34)
MCHC RBC-ENTMCNC: 33.5 GM/DL — SIGNIFICANT CHANGE UP (ref 32–36)
MCV RBC AUTO: 89.6 FL — SIGNIFICANT CHANGE UP (ref 80–100)
NRBC # BLD: 0 /100 WBCS — SIGNIFICANT CHANGE UP (ref 0–0)
PHOSPHATE SERPL-MCNC: 3.9 MG/DL — SIGNIFICANT CHANGE UP (ref 2.5–4.5)
PHOSPHATE SERPL-MCNC: 4.6 MG/DL — HIGH (ref 2.5–4.5)
PLATELET # BLD AUTO: 176 K/UL — SIGNIFICANT CHANGE UP (ref 150–400)
POTASSIUM SERPL-MCNC: 3.7 MMOL/L — SIGNIFICANT CHANGE UP (ref 3.5–5.3)
POTASSIUM SERPL-MCNC: 4.2 MMOL/L — SIGNIFICANT CHANGE UP (ref 3.5–5.3)
POTASSIUM SERPL-SCNC: 3.7 MMOL/L — SIGNIFICANT CHANGE UP (ref 3.5–5.3)
POTASSIUM SERPL-SCNC: 4.2 MMOL/L — SIGNIFICANT CHANGE UP (ref 3.5–5.3)
PROT SERPL-MCNC: 6.7 G/DL — SIGNIFICANT CHANGE UP (ref 6–8.3)
PROT SERPL-MCNC: 6.7 G/DL — SIGNIFICANT CHANGE UP (ref 6–8.3)
PTH-INTACT IO % DIF SERPL: 21.2 PG/ML — SIGNIFICANT CHANGE UP (ref 15–65)
PTH-INTACT IO % DIF SERPL: 30.1 PG/ML — SIGNIFICANT CHANGE UP (ref 15–65)
RBC # BLD: 4.13 M/UL — SIGNIFICANT CHANGE UP (ref 3.8–5.2)
RBC # FLD: 12.4 % — SIGNIFICANT CHANGE UP (ref 10.3–14.5)
SODIUM SERPL-SCNC: 137 MMOL/L — SIGNIFICANT CHANGE UP (ref 135–145)
SODIUM SERPL-SCNC: 139 MMOL/L — SIGNIFICANT CHANGE UP (ref 135–145)
WBC # BLD: 7.86 K/UL — SIGNIFICANT CHANGE UP (ref 3.8–10.5)
WBC # FLD AUTO: 7.86 K/UL — SIGNIFICANT CHANGE UP (ref 3.8–10.5)

## 2021-06-17 PROCEDURE — 83735 ASSAY OF MAGNESIUM: CPT

## 2021-06-17 PROCEDURE — C1889: CPT

## 2021-06-17 PROCEDURE — 38510 BIOPSY/REMOVAL LYMPH NODES: CPT

## 2021-06-17 PROCEDURE — 88307 TISSUE EXAM BY PATHOLOGIST: CPT

## 2021-06-17 PROCEDURE — 60240 REMOVAL OF THYROID: CPT

## 2021-06-17 PROCEDURE — 84100 ASSAY OF PHOSPHORUS: CPT

## 2021-06-17 PROCEDURE — 88331 PATH CONSLTJ SURG 1 BLK 1SPC: CPT

## 2021-06-17 PROCEDURE — 36415 COLL VENOUS BLD VENIPUNCTURE: CPT

## 2021-06-17 PROCEDURE — 83970 ASSAY OF PARATHORMONE: CPT

## 2021-06-17 PROCEDURE — 88305 TISSUE EXAM BY PATHOLOGIST: CPT

## 2021-06-17 PROCEDURE — 80053 COMPREHEN METABOLIC PANEL: CPT

## 2021-06-17 PROCEDURE — 85027 COMPLETE CBC AUTOMATED: CPT

## 2021-06-17 RX ORDER — ACETAMINOPHEN 500 MG
1000 TABLET ORAL ONCE
Refills: 0 | Status: COMPLETED | OUTPATIENT
Start: 2021-06-17 | End: 2021-06-17

## 2021-06-17 RX ORDER — CALCITRIOL 0.5 UG/1
0.5 CAPSULE ORAL ONCE
Refills: 0 | Status: COMPLETED | OUTPATIENT
Start: 2021-06-17 | End: 2021-06-17

## 2021-06-17 RX ORDER — CALCIUM CARBONATE 500(1250)
2 TABLET ORAL
Refills: 0 | Status: DISCONTINUED | OUTPATIENT
Start: 2021-06-17 | End: 2021-06-17

## 2021-06-17 RX ORDER — MAGNESIUM SULFATE 500 MG/ML
2 VIAL (ML) INJECTION ONCE
Refills: 0 | Status: COMPLETED | OUTPATIENT
Start: 2021-06-17 | End: 2021-06-17

## 2021-06-17 RX ORDER — CALCIUM CARBONATE 500(1250)
1 TABLET ORAL
Refills: 0 | Status: DISCONTINUED | OUTPATIENT
Start: 2021-06-17 | End: 2021-06-17

## 2021-06-17 RX ORDER — POTASSIUM CHLORIDE 20 MEQ
40 PACKET (EA) ORAL ONCE
Refills: 0 | Status: COMPLETED | OUTPATIENT
Start: 2021-06-17 | End: 2021-06-17

## 2021-06-17 RX ORDER — SCOPALAMINE 1 MG/3D
1 PATCH, EXTENDED RELEASE TRANSDERMAL
Refills: 0 | Status: DISCONTINUED | OUTPATIENT
Start: 2021-06-17 | End: 2021-06-17

## 2021-06-17 RX ADMIN — Medication 400 MILLIGRAM(S): at 13:01

## 2021-06-17 RX ADMIN — BENZOCAINE AND MENTHOL 1 LOZENGE: 5; 1 LIQUID ORAL at 13:00

## 2021-06-17 RX ADMIN — Medication 1000 MILLIGRAM(S): at 13:02

## 2021-06-17 RX ADMIN — SCOPALAMINE 1 PATCH: 1 PATCH, EXTENDED RELEASE TRANSDERMAL at 02:05

## 2021-06-17 RX ADMIN — Medication 1 TABLET(S): at 00:46

## 2021-06-17 RX ADMIN — ONDANSETRON 4 MILLIGRAM(S): 8 TABLET, FILM COATED ORAL at 00:46

## 2021-06-17 RX ADMIN — Medication 50 GRAM(S): at 00:09

## 2021-06-17 RX ADMIN — BENZOCAINE AND MENTHOL 1 LOZENGE: 5; 1 LIQUID ORAL at 09:30

## 2021-06-17 RX ADMIN — BENZOCAINE AND MENTHOL 1 LOZENGE: 5; 1 LIQUID ORAL at 05:53

## 2021-06-17 RX ADMIN — Medication 40 MILLIEQUIVALENT(S): at 16:32

## 2021-06-17 RX ADMIN — CALCITRIOL 0.5 MICROGRAM(S): 0.5 CAPSULE ORAL at 09:30

## 2021-06-17 RX ADMIN — SCOPALAMINE 1 PATCH: 1 PATCH, EXTENDED RELEASE TRANSDERMAL at 06:18

## 2021-06-17 RX ADMIN — Medication 1000 MILLIGRAM(S): at 06:07

## 2021-06-17 RX ADMIN — Medication 400 MILLIGRAM(S): at 05:52

## 2021-06-17 NOTE — PROGRESS NOTE ADULT - ASSESSMENT
35F PMHx Graves's disease, hyperthyroidism with diffusely enlarged thyroid gland presents for elective total thyroidectomy with lymph node sampling.    pain/nausea control  IS/OOB  soft  SCDs, no dvt ppx necessary  10Fr JM  23 hr obs  repeat labs  rocaltrol 0.5mcg x1  calcium 1000mg BID  dc later today

## 2021-06-17 NOTE — DISCHARGE NOTE NURSING/CASE MANAGEMENT/SOCIAL WORK - PATIENT PORTAL LINK FT
You can access the FollowMyHealth Patient Portal offered by [facility] by registering at the following website: [email or web address]. By joining [place]’s FollowMyHealth portal, you will also be able to view your health information using other applications (apps) compatible with our system.

## 2021-06-17 NOTE — DISCHARGE NOTE NURSING/CASE MANAGEMENT/SOCIAL WORK - NSDCPEFALRISK_GEN_ALL_CORE
Phone Number Called:   [id number] - [place], [place] - [address]  [address]  [place] [address]  Phone: [phone] Fax: [phone]      Message: Pt states that the pharmacy is saying that they have not received the medication. So I have called the pharmacy and they state that the medication has been ready for 3 days now. I have called the pt back and informed him that the medication is ready for .    Left Message for patient to call back: no        
VOICEMAIL  1. Caller Name: Israel Beltre                      Call Back Number: [phone] (home) [phone] (work)    2. Message: Pt left a message stating that he has been trying to get a refill on his Citalopram for a while. I have tried to call the pot back, but there was no answer. I left a message for him to call us back.    3. Patient approves office to leave a detailed voicemail/MyChart message: no    
Patient information on fall and injury prevention

## 2021-06-17 NOTE — PROGRESS NOTE ADULT - SUBJECTIVE AND OBJECTIVE BOX
POST-OPERATIVE NOTE    Procedure: Total thyroidectomy     Diagnosis/Indication: Graves disease     Surgeon: Dr. Coley    S: Pt c/o nausea and was given zofran. Denies CP, SOB, HUTSON, calf tenderness. Pain controlled with medication. Denies vomiting.    O:  T(C): 36.1 (06-16-21 @ 19:45), Max: 36.1 (06-16-21 @ 19:45)  T(F): 97 (06-16-21 @ 19:45), Max: 97 (06-16-21 @ 19:45)  HR: 74 (06-16-21 @ 20:36) (62 - 85)  BP: 108/61 (06-16-21 @ 20:36) (102/62 - 113/63)  RR: 15 (06-16-21 @ 20:36) (13 - 18)  SpO2: 100% (06-16-21 @ 20:36) (99% - 100%)  Wt(kg): --    Gen: NAD, resting comfortably in bed  HEENT: Neck is covered with gauze which is C/D/I with 1 JM on right side with serosang output  C/V: NSR  Pulm: Nonlabored breathing, no respiratory distress  Abd: soft, NT/ND  Extrem: WWP, no calf edema or tenderness, SCDs in place    A/P: 35y Female s/p above procedure  Diet: soft  IVF: LR @ 75cc/hr till tolerating food  Pain/nausea control  SCDs/OOBA/IS  Dispo pending pain control, PO tolerance, clinical improvement
INTERVAL HPI/OVERNIGHT EVENTS: pt c/o nausea in PACU zofran given, POC wnl, 22:00 labs txt to Dr. Coley, 500mg BID of calcium carbonate started, passed TOV (250cc), pt c/o of tingling in both hands, VSS    STATUS POST:  total thyroidectomy    POST OPERATIVE DAY #: 1    SUBJECTIVE:  pt seen at bedside, complaints of cough and sinus headache. complains of tingling in the right fingers    MEDICATIONS  (STANDING):  benzocaine 15 mG/menthol 3.6 mG Lozenge 1 Lozenge Oral every 3 hours  budesonide  80 MICROgram(s)/formoterol 4.5 MICROgram(s) Inhaler 2 Puff(s) Inhalation two times a day  calcium carbonate    500 mG (Tums) Chewable 2 Tablet(s) Chew two times a day  lactated ringers. 1000 milliLiter(s) (75 mL/Hr) IV Continuous <Continuous>  propranolol 20 milliGRAM(s) Oral two times a day  scopolamine 1 mG/72 Hr(s) Patch 1 Patch Transdermal every 72 hours    MEDICATIONS  (PRN):  ondansetron Injectable 4 milliGRAM(s) IV Push every 6 hours PRN Nausea      Vital Signs Last 24 Hrs  T(C): 36.7 (17 Jun 2021 05:00), Max: 37.2 (16 Jun 2021 23:50)  T(F): 98 (17 Jun 2021 05:00), Max: 99 (16 Jun 2021 23:50)  HR: 80 (17 Jun 2021 05:00) (62 - 85)  BP: 106/66 (17 Jun 2021 05:00) (102/62 - 117/68)  BP(mean): 79 (17 Jun 2021 05:00) (74 - 84)  RR: 17 (17 Jun 2021 05:00) (13 - 18)  SpO2: 95% (17 Jun 2021 05:00) (95% - 100%)    PHYSICAL EXAM:      Constitutional: A&Ox3    Neck: dressing in place, unsaturated, JM x 1 in minimal serosang output    Respiratory: non labored breathing, no respiratory distress    Cardiovascular: NSR, RRR    Gastrointestinal: soft, nontender, nondistended    Extremities: (-) edema                  I&O's Detail    16 Jun 2021 07:01  -  17 Jun 2021 07:00  --------------------------------------------------------  IN:    IV PiggyBack: 200 mL    IV PiggyBack: 50 mL    Lactated Ringers: 900 mL    Lactated Ringers Bolus: 1300 mL  Total IN: 2450 mL    OUT:    Bulb (mL): 40 mL    Estimated Blood Loss (mL): 100 mL    Voided (mL): 1475 mL  Total OUT: 1615 mL    Total NET: 835 mL          LABS:                        12.4   7.86  )-----------( 176      ( 17 Jun 2021 06:29 )             37.0     06-17    139  |  104  |  7   ----------------------------<  138<H>  4.2   |  23  |  0.52    Ca    7.4<L>      17 Jun 2021 06:29  Phos  4.6     06-17  Mg     2.2     06-17    TPro  6.7  /  Alb  3.5  /  TBili  0.5  /  DBili  x   /  AST  23  /  ALT  21  /  AlkPhos  61  06-17          RADIOLOGY & ADDITIONAL STUDIES:

## 2021-06-18 ENCOUNTER — APPOINTMENT (OUTPATIENT)
Dept: OTOLARYNGOLOGY | Facility: CLINIC | Age: 36
End: 2021-06-18
Payer: COMMERCIAL

## 2021-06-18 VITALS
HEART RATE: 55 BPM | TEMPERATURE: 98.1 F | DIASTOLIC BLOOD PRESSURE: 52 MMHG | SYSTOLIC BLOOD PRESSURE: 100 MMHG | OXYGEN SATURATION: 99 %

## 2021-06-18 PROBLEM — T78.40XA ALLERGY, UNSPECIFIED, INITIAL ENCOUNTER: Chronic | Status: ACTIVE | Noted: 2021-06-15

## 2021-06-18 PROBLEM — E05.00 THYROTOXICOSIS WITH DIFFUSE GOITER WITHOUT THYROTOXIC CRISIS OR STORM: Chronic | Status: ACTIVE | Noted: 2021-06-15

## 2021-06-18 PROCEDURE — 31575 DIAGNOSTIC LARYNGOSCOPY: CPT | Mod: 58

## 2021-06-18 PROCEDURE — 99024 POSTOP FOLLOW-UP VISIT: CPT

## 2021-06-18 PROCEDURE — 10140 I&D HMTMA SEROMA/FLUID COLLJ: CPT | Mod: 58

## 2021-06-18 RX ORDER — IODINE AND POTASSIUM IODIDE .05; .1 G/ML; G/ML
5-10 SOLUTION TOPICAL
Qty: 1 | Refills: 0 | Status: DISCONTINUED | COMMUNITY
Start: 2021-05-20 | End: 2021-06-18

## 2021-06-18 RX ORDER — METHIMAZOLE 10 MG/1
10 TABLET ORAL
Qty: 60 | Refills: 5 | Status: DISCONTINUED | COMMUNITY
Start: 2020-07-01 | End: 2021-06-18

## 2021-06-18 NOTE — HISTORY OF PRESENT ILLNESS
[de-identified] : Mary is a generally healthy 35-year-old female  for " Early Intervention."  She was first noted to have an enlarged thyroid gland at the age of 20 and given the diagnosis of Hashimoto's in her 20's.  She became hypothyroid in  and began taking thyroid hormone and 2 months later became pregnant with her son born full term by .  Her son is healthy and ~ 2 years old. She became hyperthyroid ~ 6 months after she delivered and became progressively more symptomatic with hair loss, brittle nails, tachycardia, weight loss, severe fatigue, joint pain, heat intolerance and stopped thyroid hormone.  She became pregnant again in 2020 but miscarried at 18 weeks of gestation.  At the end of 2020 she started antithyroid meds (Methimazole 5 mg) increased to 20 mg daily along with propranolol.  After her miscarriage she was referred to another Endocrinologist and noted to have elevated TSH receptor Ab 296.0 IU/L, ( ULN 1.75) and TSI elevated to 667.00 IU/L (ULN 0.55).  TSH was suppressed and she continued Methimazole but decreased dose 2 months ago with some difficulty maintaining a euthyroid state. Her labs last month indicated near euthyroidism with T4 of 1.1, T3 total 184 (), TSH < 0.01. She has dry eyes, lagophthalmos and fluctuating increased IOP but minimal proptosis. Mary denies recent shortness of breath, voice changes, dysphagia, anterior neck pain, neck pressure or mass. She has not had an ultrasound since last year but stated the gland was enlarged and caused difficulty breathing at night when lying flat in bed. There is no family history of thyroid cancer.  A paternal GM has thyroid disease of uncertain etiology. She denies any known radiation exposures in her youth. \par  [FreeTextEntry1] : Mary returns after an uneventful total thyroidectomy on  06/16/2021.   She had a few paresthesias in her right hand and is  taking calcium 2 Citracals TID vitamin D3 2K IU supplements.  Hospital discharge calcium was over 8.0 corrected with a normal PTH. She is tolerating a soft diet.  Voice is still mildly hoarse. She noted some lower neck swelling this am but the neck is soft. Surgical pathology is still pending.\par

## 2021-06-18 NOTE — PROCEDURE
[Image(s) Captured] : image(s) captured and filed [Gag Reflex] : gag reflex preventing mirror examination [Topical Lidocaine] : topical lidocaine [Oxymetazoline HCl] : oxymetazoline HCl [Flexible Endoscope] : examined with the flexible endoscope [Serial Number: ___] : Serial Number: [unfilled] [Unable to Cooperate with Mirror] : patient unable to cooperate with mirror [Hoarseness] : hoarseness not clearly evaluated by indirect laryngoscopy [de-identified] : The nasal septum is minimally deviated to the left. There are no masses or polyps and the nasal mucosa and secretions are normal. The choanae and posterior nasopharynx are normal without masses or drainage. The Eustachian tube orifices appear patent. The pharynx, including the posterior and lateral pharyngeal walls, the vallecula and base of tongue are normal without ulcerations, lesions or masses. The hypopharynx including the pyriform sinuses open well without pooling of secretions, mucosal lesions or masses. The supraglottic larynx including the epiglottis, petiole, arytenoids, glossoepiglottic, aryepiglottic and pharyngoepiglottic folds are normal without mucosal lesions, ulcerations or masses. The glottis reveals normal false vocal folds. The true vocal folds are hyperemic but tense and of equal length, without paralysis, having symmetric mobility on adduction and abduction. There are no mucosal lesions, nodules, cysts, erythroplasia or leukoplakia. The posterior cricoid area has healthy pink mucosa in the interarytenoid area and esophageal inlet. There is mild  thickening/edema of the interarytenoid mucosa suggestive of posterior laryngitis from laryngopharyngeal acid reflux disease. The trachea is clear without narrowing in the immediate subglottic region, without deviation or lesions.  [de-identified] : postop evaluation after total thyroidectomy

## 2021-06-18 NOTE — PHYSICAL EXAM
[Normal] : no mass and no adenopathy [de-identified] : The neck is slightly swollen and a small seroma was aspirated sterily. The subcuticular suture was removed. The voice is raspy.  A Chvostek sign was minimally present.

## 2021-06-18 NOTE — REASON FOR VISIT
[FreeTextEntry2] : a first post op visit after total thyroidectomy for Graves' disease and hyperthyroidism for surgical consultation. [FreeTextEntry1] : Referred by endocrinologist Nataly Olivarez MD, PCP is Tino Calix MD

## 2021-06-18 NOTE — CONSULT LETTER
[Dear  ___] : Dear  [unfilled], [Consult Letter:] : I had the pleasure of evaluating your patient, [unfilled]. [Please see my note below.] : Please see my note below. [Consult Closing:] : Thank you very much for allowing me to participate in the care of this patient.  If you have any questions, please do not hesitate to contact me. [Sincerely,] : Sincerely, [FreeTextEntry3] : \par Bassem Coley M.D., FACS, ECNU\par Director Center for Thyroid & Parathyroid Surgery\par [facility] [place] [facility]\par Certified in Thyroid/Parathyroid/Neck Ultrasound, ECNU/ AIUM\par \par , Department of Otolaryngology\par [facility] at [facility]\par

## 2021-06-19 LAB
ALBUMIN SERPL ELPH-MCNC: 3.9 G/DL
CA-I SERPL-SCNC: 1.03 MMOL/L
CALCIUM SERPL-MCNC: 7.7 MG/DL
CALCIUM SERPL-MCNC: 7.7 MG/DL
PARATHYROID HORMONE INTACT: 20 PG/ML
PHOSPHATE SERPL-MCNC: 4.9 MG/DL

## 2021-06-21 ENCOUNTER — APPOINTMENT (OUTPATIENT)
Dept: OTOLARYNGOLOGY | Facility: CLINIC | Age: 36
End: 2021-06-21
Payer: COMMERCIAL

## 2021-06-21 VITALS
DIASTOLIC BLOOD PRESSURE: 70 MMHG | OXYGEN SATURATION: 97 % | TEMPERATURE: 98.2 F | HEART RATE: 120 BPM | SYSTOLIC BLOOD PRESSURE: 115 MMHG

## 2021-06-21 PROCEDURE — 10140 I&D HMTMA SEROMA/FLUID COLLJ: CPT | Mod: 58

## 2021-06-21 PROCEDURE — 99024 POSTOP FOLLOW-UP VISIT: CPT

## 2021-06-21 NOTE — CONSULT LETTER
[Dear  ___] : Dear  [unfilled], [Consult Letter:] : I had the pleasure of evaluating your patient, [unfilled]. [Please see my note below.] : Please see my note below. [Consult Closing:] : Thank you very much for allowing me to participate in the care of this patient.  If you have any questions, please do not hesitate to contact me. [Sincerely,] : Sincerely, [FreeTextEntry3] : \par Bassem Coley M.D., FACS, ECNU\par Director Center for Thyroid & Parathyroid Surgery\par [facility] [place] at [facility]\par Certified in Thyroid/Parathyroid/Neck Ultrasound, ECNU/ AIUM\par \par , Department of Otolaryngology\par [facility] at [facility]\par

## 2021-06-21 NOTE — REASON FOR VISIT
[FreeTextEntry2] : a second  post op visit after total thyroidectomy for Graves' disease and hyperthyroidism for surgical consultation. [FreeTextEntry1] : Referred by endocrinologist Nataly Olivarez MD, PCP is Tino Calix MD no

## 2021-06-21 NOTE — PHYSICAL EXAM
[Normal] : no mass and no adenopathy [de-identified] : The neck is again slightly swollen and a small seroma was reaspirated sterily (serosanguinous). The steritrips were refreshed. The voice is less raspy.  A Chvostek sign was minimally present.

## 2021-06-21 NOTE — HISTORY OF PRESENT ILLNESS
[de-identified] : Mary is a generally healthy 35-year-old female  for " Early Intervention."  She was first noted to have an enlarged thyroid gland at the age of 20 and given the diagnosis of Hashimoto's in her 20's.  She became hypothyroid in  and began taking thyroid hormone and 2 months later became pregnant with her son born full term by .  Her son is healthy and ~ 2 years old. She became hyperthyroid ~ 6 months after she delivered and became progressively more symptomatic with hair loss, brittle nails, tachycardia, weight loss, severe fatigue, joint pain, heat intolerance and stopped thyroid hormone.  She became pregnant again in 2020 but miscarried at 18 weeks of gestation.  At the end of 2020 she started antithyroid meds (Methimazole 5 mg) increased to 20 mg daily along with propranolol.  After her miscarriage she was referred to another Endocrinologist and noted to have elevated TSH receptor Ab 296.0 IU/L, ( ULN 1.75) and TSI elevated to 667.00 IU/L (ULN 0.55).  TSH was suppressed and she continued Methimazole but decreased dose 2 months ago with some difficulty maintaining a euthyroid state. Her labs last month indicated near euthyroidism with T4 of 1.1, T3 total 184 (), TSH < 0.01. She has dry eyes, lagophthalmos and fluctuating increased IOP but minimal proptosis. Mary denies recent shortness of breath, voice changes, dysphagia, anterior neck pain, neck pressure or mass. She has not had an ultrasound since last year but stated the gland was enlarged and caused difficulty breathing at night when lying flat in bed. There is no family history of thyroid cancer.  A paternal GM has thyroid disease of uncertain etiology. She denies any known radiation exposures in her youth. \par  [FreeTextEntry1] : Mary returns after an uneventful total thyroidectomy on 06/16/2021.   She has less paresthesias now and is  taking calcium 2 Citracals TID and Rocaltrol 0.5 mcgs daily as her serum calcium was low on Friday despite a normal PTH.  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   Hospital discharge calcium was over 8.0 corrected with a normal PTH. She is tolerating a soft diet.  Voice is less hoarse. She has a recurrent seroma.  Surgical pathology is still pending.\par

## 2021-06-22 LAB
25(OH)D3 SERPL-MCNC: 25.7 NG/ML
25(OH)D3 SERPL-MCNC: 26.8 NG/ML
ALBUMIN SERPL ELPH-MCNC: 4.8 G/DL
ALBUMIN SERPL ELPH-MCNC: 4.9 G/DL
ALP BLD-CCNC: 77 U/L
ALT SERPL-CCNC: 28 U/L
ANION GAP SERPL CALC-SCNC: 13 MMOL/L
AST SERPL-CCNC: 16 U/L
BILIRUB SERPL-MCNC: 0.4 MG/DL
BUN SERPL-MCNC: 8 MG/DL
CA-I FLD-SCNC: 1.29 MMOL/L
CA-I SERPL-SCNC: 1.3 MMOL/L
CALCIUM SERPL-MCNC: 10.1 MG/DL
CALCIUM SERPL-MCNC: 10.1 MG/DL
CALCIUM SERPL-MCNC: 10.3 MG/DL
CALCIUM SERPL-MCNC: 10.3 MG/DL
CHLORIDE SERPL-SCNC: 100 MMOL/L
CO2 SERPL-SCNC: 25 MMOL/L
CREAT SERPL-MCNC: 0.52 MG/DL
GLUCOSE SERPL-MCNC: 92 MG/DL
PARATHYROID HORMONE INTACT: 19 PG/ML
PARATHYROID HORMONE INTACT: 20 PG/ML
PHOSPHATE SERPL-MCNC: 3.9 MG/DL
POTASSIUM SERPL-SCNC: 4.1 MMOL/L
PROT SERPL-MCNC: 7.7 G/DL
SODIUM SERPL-SCNC: 138 MMOL/L
T4 FREE SERPL-MCNC: 1 NG/DL
TSH SERPL-ACNC: 0.02 UIU/ML

## 2021-06-23 LAB — SURGICAL PATHOLOGY STUDY: SIGNIFICANT CHANGE UP

## 2021-06-24 ENCOUNTER — APPOINTMENT (OUTPATIENT)
Dept: OTOLARYNGOLOGY | Facility: CLINIC | Age: 36
End: 2021-06-24
Payer: COMMERCIAL

## 2021-06-24 VITALS
DIASTOLIC BLOOD PRESSURE: 64 MMHG | HEART RATE: 64 BPM | SYSTOLIC BLOOD PRESSURE: 106 MMHG | RESPIRATION RATE: 15 BRPM | OXYGEN SATURATION: 95 % | TEMPERATURE: 98.4 F

## 2021-06-24 PROCEDURE — 99024 POSTOP FOLLOW-UP VISIT: CPT

## 2021-06-24 NOTE — HISTORY OF PRESENT ILLNESS
[de-identified] : Mary is a generally healthy 35-year-old female  for " Early Intervention."  She was first noted to have an enlarged thyroid gland at the age of 20 and given the diagnosis of Hashimoto's in her 20's.  She became hypothyroid in  and began taking thyroid hormone and 2 months later became pregnant with her son born full term by .  Her son is healthy and ~ 2 years old. She became hyperthyroid ~ 6 months after she delivered and became progressively more symptomatic with hair loss, brittle nails, tachycardia, weight loss, severe fatigue, joint pain, heat intolerance and stopped thyroid hormone.  She became pregnant again in 2020 but miscarried at 18 weeks of gestation.  At the end of 2020 she started antithyroid meds (Methimazole 5 mg) increased to 20 mg daily along with propranolol.  After her miscarriage she was referred to another Endocrinologist and noted to have elevated TSH receptor Ab 296.0 IU/L, ( ULN 1.75) and TSI elevated to 667.00 IU/L (ULN 0.55).  TSH was suppressed and she continued Methimazole but decreased dose 2 months ago with some difficulty maintaining a euthyroid state. Her labs last month indicated near euthyroidism with T4 of 1.1, T3 total 184 (), TSH < 0.01. She has dry eyes, lagophthalmos and fluctuating increased IOP but minimal proptosis. Mary denies recent shortness of breath, voice changes, dysphagia, anterior neck pain, neck pressure or mass. She has not had an ultrasound since last year but stated the gland was enlarged and caused difficulty breathing at night when lying flat in bed. There is no family history of thyroid cancer.  A paternal GM has thyroid disease of uncertain etiology. She denies any known radiation exposures in her youth. \par  [FreeTextEntry1] : Mary returns after an uneventful total thyroidectomy on 06/16/2021.   She has no paresthesias or muscle cramping now and is  taking calcium 2 Citracals BID and Rocaltrol 0.5 mcgs QOD and labs are now normal.  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  She is tolerating a soft diet.  Voice is less hoarse. Her seroma has not recurred.  Surgical pathology was consistent with Graves' disease.

## 2021-06-24 NOTE — CONSULT LETTER
[Dear  ___] : Dear  [unfilled], [Consult Letter:] : I had the pleasure of evaluating your patient, [unfilled]. [Please see my note below.] : Please see my note below. [Consult Closing:] : Thank you very much for allowing me to participate in the care of this patient.  If you have any questions, please do not hesitate to contact me. [Sincerely,] : Sincerely, [FreeTextEntry3] : \par Bassem Coley M.D., FACS, ECNU\par Director Center for Thyroid & Parathyroid Surgery\par [facility] [place] at [facility]\par Certified in Thyroid/Parathyroid/Neck Ultrasound, ECNU/ AIUM\par \par , Department of Otolaryngology\par [facility] at [facility]\par

## 2021-06-24 NOTE — REASON FOR VISIT
[FreeTextEntry2] : a third post op visit after total thyroidectomy for Graves' disease and hyperthyroidism for surgical consultation. [FreeTextEntry1] : Referred by endocrinologist Nataly Olivarez MD, PCP is Tino Calix MD

## 2021-06-24 NOTE — PHYSICAL EXAM
[Normal] : no mass and no adenopathy [de-identified] : The neck is again slightly swollen and a small seroma was reaspirated sterily (serosanguinous). The steritrips were refreshed. The voice is less raspy.  A Chvostek sign was minimally present.

## 2021-07-08 ENCOUNTER — APPOINTMENT (OUTPATIENT)
Dept: OTOLARYNGOLOGY | Facility: CLINIC | Age: 36
End: 2021-07-08
Payer: COMMERCIAL

## 2021-07-08 VITALS
DIASTOLIC BLOOD PRESSURE: 70 MMHG | SYSTOLIC BLOOD PRESSURE: 104 MMHG | TEMPERATURE: 98.7 F | HEART RATE: 74 BPM | OXYGEN SATURATION: 96 %

## 2021-07-08 DIAGNOSIS — E89.822 POSTPROCEDURAL SEROMA OF AN ENDOCRINE SYSTEM ORGAN OR STRUCTURE FOLLOWING AN ENDOCRINE SYSTEM PROCEDURE: ICD-10-CM

## 2021-07-08 PROCEDURE — 11900 INJECT SKIN LESIONS </W 7: CPT | Mod: 58

## 2021-07-08 PROCEDURE — 99024 POSTOP FOLLOW-UP VISIT: CPT

## 2021-07-08 NOTE — CONSULT LETTER
[FreeTextEntry3] : \par Bassem Coley M.D., FACS, ECNU\par Director Center for Thyroid & Parathyroid Surgery\par [facility] [place] [facility]\par Certified in Thyroid/Parathyroid/Neck Ultrasound, ECNU/ AIUM\par \par , Department of Otolaryngology\par [facility] at [facility]\par

## 2021-07-08 NOTE — HISTORY OF PRESENT ILLNESS
[de-identified] : Mary is a generally healthy 35-year-old female  for " Early Intervention."  She was first noted to have an enlarged thyroid gland at the age of 20 and given the diagnosis of Hashimoto's in her 20's.  She became hypothyroid in  and began taking thyroid hormone and 2 months later became pregnant with her son born full term by .  Her son is healthy and ~ 2 years old. She became hyperthyroid ~ 6 months after she delivered and became progressively more symptomatic with hair loss, brittle nails, tachycardia, weight loss, severe fatigue, joint pain, heat intolerance and stopped thyroid hormone.  She became pregnant again in 2020 but miscarried at 18 weeks of gestation.  At the end of 2020 she started antithyroid meds (Methimazole 5 mg) increased to 20 mg daily along with propranolol.  After her miscarriage she was referred to another Endocrinologist and noted to have elevated TSH receptor Ab 296.0 IU/L, ( ULN 1.75) and TSI elevated to 667.00 IU/L (ULN 0.55).  TSH was suppressed and she continued Methimazole but decreased dose 2 months ago with some difficulty maintaining a euthyroid state. Her labs last month indicated near euthyroidism with T4 of 1.1, T3 total 184 (), TSH < 0.01. She has dry eyes, lagophthalmos and fluctuating increased IOP but minimal proptosis. Mary denies recent shortness of breath, voice changes, dysphagia, anterior neck pain, neck pressure or mass. She has not had an ultrasound since last year but stated the gland was enlarged and caused difficulty breathing at night when lying flat in bed. There is no family history of thyroid cancer.  A paternal GM has thyroid disease of uncertain etiology. She denies any known radiation exposures in her youth. \par  [FreeTextEntry1] : Mary returns after an uneventful total thyroidectomy on 06/16/2021.   She has no paresthesias or muscle cramping now and was taking calcium 2 Citracals BID and Rocaltrol 0.5 mcgs QOD and labs had normalized on 06/21/2021 with (Ca/PTH of 10.3/ 19).  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  She is tolerating a regular diet.  Overall she feels improved since her surgery with fewer mood swings but still concerned about memory loss.  She is not getting adequate sleep, however, with a 2 year old at home. Voice is less hoarse and vocal fold mobility was normal post op.  Her seroma has not recurred.  Surgical pathology was consistent with Graves' disease.

## 2021-07-08 NOTE — PHYSICAL EXAM
[de-identified] : The neck is no longer swollen and a small seroma has not recurred. The incision is showing early hypertrophic changes and injected with .25 cc of Kenalog 10 mg. The voice is less raspy.  A [place] sign was minimally present.

## 2021-07-08 NOTE — REASON FOR VISIT
[FreeTextEntry2] : a fourth post op visit after total thyroidectomy for Graves' disease and hyperthyroidism for surgical consultation. [FreeTextEntry1] : Referred by endocrinologist Nataly Olivarez MD, PCP is Tino Calix MD

## 2021-07-09 LAB
25(OH)D3 SERPL-MCNC: 29.7 NG/ML
ALBUMIN SERPL ELPH-MCNC: 4.4 G/DL
ALP BLD-CCNC: 67 U/L
ALT SERPL-CCNC: 21 U/L
ANION GAP SERPL CALC-SCNC: 11 MMOL/L
AST SERPL-CCNC: 17 U/L
BILIRUB SERPL-MCNC: 0.9 MG/DL
BUN SERPL-MCNC: 6 MG/DL
CALCIUM SERPL-MCNC: 9.1 MG/DL
CALCIUM SERPL-MCNC: 9.1 MG/DL
CHLORIDE SERPL-SCNC: 107 MMOL/L
CO2 SERPL-SCNC: 21 MMOL/L
CREAT SERPL-MCNC: 0.55 MG/DL
GLUCOSE SERPL-MCNC: 96 MG/DL
PARATHYROID HORMONE INTACT: 68 PG/ML
POTASSIUM SERPL-SCNC: 3.8 MMOL/L
PROT SERPL-MCNC: 6.8 G/DL
SODIUM SERPL-SCNC: 139 MMOL/L
T4 FREE SERPL-MCNC: 1.5 NG/DL
TSH SERPL-ACNC: 0.16 UIU/ML

## 2021-07-16 ENCOUNTER — APPOINTMENT (OUTPATIENT)
Dept: ENDOCRINOLOGY | Facility: CLINIC | Age: 36
End: 2021-07-16
Payer: COMMERCIAL

## 2021-07-16 VITALS
WEIGHT: 201 LBS | DIASTOLIC BLOOD PRESSURE: 74 MMHG | HEART RATE: 90 BPM | SYSTOLIC BLOOD PRESSURE: 117 MMHG | BODY MASS INDEX: 29.68 KG/M2

## 2021-07-16 DIAGNOSIS — E05.90 THYROTOXICOSIS, UNSPECIFIED W/OUT THYROTOXIC CRISIS OR STORM: ICD-10-CM

## 2021-07-16 DIAGNOSIS — E06.3 AUTOIMMUNE THYROIDITIS: ICD-10-CM

## 2021-07-16 DIAGNOSIS — E05.00 THYROTOXICOSIS WITH DIFFUSE GOITER W/OUT THYROTOXIC CRISIS OR STORM: ICD-10-CM

## 2021-07-16 DIAGNOSIS — E07.9 DISORDER OF THYROID, UNSPECIFIED: ICD-10-CM

## 2021-07-16 PROCEDURE — 99214 OFFICE O/P EST MOD 30 MIN: CPT

## 2021-07-16 RX ORDER — AZITHROMYCIN 250 MG/1
250 TABLET, FILM COATED ORAL DAILY
Qty: 1 | Refills: 0 | Status: DISCONTINUED | COMMUNITY
Start: 2021-06-18 | End: 2021-07-16

## 2021-07-16 RX ORDER — METHYLPREDNISOLONE 4 MG/1
4 TABLET ORAL
Qty: 1 | Refills: 0 | Status: DISCONTINUED | COMMUNITY
Start: 2021-06-18 | End: 2021-07-16

## 2021-07-16 RX ORDER — AZITHROMYCIN 500 MG/1
500 TABLET, FILM COATED ORAL DAILY
Qty: 1 | Refills: 0 | Status: DISCONTINUED | COMMUNITY
Start: 2021-06-18 | End: 2021-07-16

## 2021-07-16 RX ORDER — LEVOTHYROXINE SODIUM 0.12 MG/1
125 TABLET ORAL DAILY
Qty: 60 | Refills: 3 | Status: DISCONTINUED | COMMUNITY
Start: 2021-06-18 | End: 2021-07-16

## 2021-07-16 RX ORDER — CALCITRIOL 0.5 UG/1
0.5 CAPSULE, LIQUID FILLED ORAL DAILY
Qty: 14 | Refills: 2 | Status: DISCONTINUED | COMMUNITY
Start: 2021-06-19 | End: 2021-07-16

## 2021-07-16 NOTE — HISTORY OF PRESENT ILLNESS
[FreeTextEntry1] : Underwent total thyroidectomy on Jun 16\par very tired, at end of the day, has to lie down for 10-20 minutes before she can do anything.  This was similar to when she was hypothyroid.\par no palpitations or feeling jittery. still feels a little hot\par concerned about her memory, unable to remember if she clocked into work, or where she put things, or some events from her past but these are also things that are not of much significance to her\par has muscle cramps in her legs\par some paresthesias on and off but not constant like after surgery.   not taking calcitriol \par pathology from 6/21 reviewed: c/w Graves disease.  \par labs from 7/21 reviewed: low TSH but higher than previous (0.16, up from 0.02)\par \par PMH: Graves s/p total thyroidectomy 6/21\par  early menopause, in early 40s\par \par Meds:\par levothyroxine 150mcg\par propranolol -- didn't take today\par selenium, prenatal vitamin\par

## 2021-07-16 NOTE — PHYSICAL EXAM
[Alert] : alert [Healthy Appearance] : healthy appearance [No Lid Lag] : no lid lag [Normal Hearing] : hearing was normal [No LAD] : no lymphadenopathy [Clear to Auscultation] : lungs were clear to auscultation bilaterally [Normal S1, S2] : normal S1 and S2 [Regular Rhythm] : with a regular rhythm [Normal Affect] : the affect was normal [Normal Mood] : the mood was normal [Acanthosis Nigricans] : no acanthosis nigricans [de-identified] : incision healing

## 2021-07-16 NOTE — ASSESSMENT
[FreeTextEntry1] : Surgical hypothyroidism, for Graves\par TSH may take some time to "un suppress".  [facility] form given to recheck TSH and calcium, and can also recheck TSH rec antibody, next month.\par I cannot explain why she had a more severe thyroid disease than her family and cannot tell if her son will get thyroid disease or not, though his risk will be increased since he has family history of thyroid disease (compared to someone who does not have FH)\par Memory loss is probably not related to her thyroid condition.  She is probably not remembering these things because they are not much importance to her (not special enough).  Remembering little things also takes more effort/attention to help remember.\par RTO 6 months if next month's labs are normal

## 2021-07-16 NOTE — DATA REVIEWED
[FreeTextEntry1] : 7/21: TSH 0.16, free T4 1.5, 25D 29.7, Ca 9.1, PTH 68\par 6/21: TSH 0.02, free T4 1.0, 25D 25.7\par 4/21: TSH < 0.01, free T4 1.1, T3 184, , TSH Rec Ab 36.34\par 1/21, TSH 0.02, T3 427. T4 17.8\par 9/20: TSH 0.03, T4 12.6, free T4 0.2\par 7/27/20: T3 323, free T4 1.8, \par 7/5/20: T4 24.86, free T4 3.9, T3 284.8\par 7/3/20: T4 19.33, free T4 7.6, T3 374.3, TPO 1090, TSh Rec Ab 321

## 2021-08-09 ENCOUNTER — APPOINTMENT (OUTPATIENT)
Dept: ENDOCRINOLOGY | Facility: CLINIC | Age: 36
End: 2021-08-09

## 2021-10-22 ENCOUNTER — NON-APPOINTMENT (OUTPATIENT)
Age: 36
End: 2021-10-22

## 2021-11-11 ENCOUNTER — APPOINTMENT (OUTPATIENT)
Dept: OTOLARYNGOLOGY | Facility: CLINIC | Age: 36
End: 2021-11-11
Payer: COMMERCIAL

## 2021-11-11 VITALS
TEMPERATURE: 97.9 F | HEART RATE: 62 BPM | OXYGEN SATURATION: 99 % | DIASTOLIC BLOOD PRESSURE: 54 MMHG | SYSTOLIC BLOOD PRESSURE: 104 MMHG

## 2021-11-11 DIAGNOSIS — E06.3 AUTOIMMUNE THYROIDITIS: ICD-10-CM

## 2021-11-11 DIAGNOSIS — L91.0 HYPERTROPHIC SCAR: ICD-10-CM

## 2021-11-11 DIAGNOSIS — R49.9 UNSPECIFIED VOICE AND RESONANCE DISORDER: ICD-10-CM

## 2021-11-11 PROCEDURE — 11900 INJECT SKIN LESIONS </W 7: CPT

## 2021-11-11 PROCEDURE — 99214 OFFICE O/P EST MOD 30 MIN: CPT | Mod: 25

## 2021-11-11 PROCEDURE — 31575 DIAGNOSTIC LARYNGOSCOPY: CPT

## 2021-11-11 NOTE — HISTORY OF PRESENT ILLNESS
[de-identified] : Mary is a generally healthy 35-year-old female  for " Early Intervention."  She was first noted to have an enlarged thyroid gland at the age of 20 and given the diagnosis of Hashimoto's in her 20's.  She became hypothyroid in  and began taking thyroid hormone and 2 months later became pregnant with her son born full term by .  Her son is healthy and ~ 2 years old. She became hyperthyroid ~ 6 months after she delivered and became progressively more symptomatic with hair loss, brittle nails, tachycardia, weight loss, severe fatigue, joint pain, heat intolerance and stopped thyroid hormone.  She became pregnant again in 2020 but miscarried at 18 weeks of gestation.  At the end of 2020 she started antithyroid meds (Methimazole 5 mg) increased to 20 mg daily along with propranolol.  After her miscarriage she was referred to another Endocrinologist and noted to have elevated TSH receptor Ab 296.0 IU/L, ( ULN 1.75) and TSI elevated to 667.00 IU/L (ULN 0.55).  TSH was suppressed and she continued Methimazole but decreased dose 2 months ago with some difficulty maintaining a euthyroid state. Her labs last month indicated near euthyroidism with T4 of 1.1, T3 total 184 (), TSH < 0.01. She has dry eyes, lagophthalmos and fluctuating increased IOP but minimal proptosis. Mary denies recent shortness of breath, voice changes, dysphagia, anterior neck pain, neck pressure or mass. She has not had an ultrasound since last year but stated the gland was enlarged and caused difficulty breathing at night when lying flat in bed. There is no family history of thyroid cancer.  A paternal GM has thyroid disease of uncertain etiology. She denies any known radiation exposures in her youth. \par  [FreeTextEntry1] : Mary returns after an uneventful total thyroidectomy on 06/16/2021.   She has no paresthesias or muscle cramping now and was taking calcium 2 Citracals BID and Rocaltrol 0.5 mcgs QOD and labs had normalized on 06/21/2021 with (Ca/PTH of 10.3/ 19).  Her last Ca/PTH in July was 9.1/68.  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  She is now only taking vitamin D3 2K IU daily.  She is tolerating a regular diet.  Overall she feels improved since her surgery with fewer mood swings but still concerned about memory loss and fatigue.  She is still not getting adequate sleep, however, with a 2 year old at home. Voice is less hoarse and vocal fold mobility was normal post op.  Surgical pathology was consistent with Graves' disease. Her incision is hypertrophic and hyperemic.

## 2021-11-11 NOTE — PROCEDURE
[Image(s) Captured] : image(s) captured and filed [Gag Reflex] : gag reflex preventing mirror examination [Topical Lidocaine] : topical lidocaine [Oxymetazoline HCl] : oxymetazoline HCl [Flexible Endoscope] : examined with the flexible endoscope [Serial Number: ___] : Serial Number: [unfilled] [de-identified] : The nasal septum is minimally deviated to the left. There are no masses or polyps and the nasal mucosa and secretions are normal. The choanae and posterior nasopharynx are normal without masses or drainage. The Eustachian tube orifices appear patent. The pharynx, including the posterior and lateral pharyngeal walls, the vallecula and base of tongue are normal without ulcerations, lesions or masses. The hypopharynx including the pyriform sinuses open well without pooling of secretions, mucosal lesions or masses. The supraglottic larynx including the epiglottis, petiole, arytenoids, glossoepiglottic, aryepiglottic and pharyngoepiglottic folds are normal without mucosal lesions, ulcerations or masses. The glottis reveals normal false vocal folds. The true vocal folds are glistening white, tense and of equal length, without paralysis, having symmetric mobility on adduction and abduction. There are no mucosal lesions, nodules, cysts, erythroplasia or leukoplakia. The posterior cricoid area has healthy pink mucosa in the interarytenoid area and esophageal inlet. There is mild  thickening/edema of the interarytenoid mucosa suggestive of posterior laryngitis from laryngopharyngeal acid reflux disease. The trachea is clear without narrowing in the immediate subglottic region, without deviation or lesions.

## 2021-11-11 NOTE — REASON FOR VISIT
[FreeTextEntry2] : a f/u visit after total thyroidectomy for Graves' disease and hyperthyroidism for surgical consultation. [FreeTextEntry1] : Referred by endocrinologist Nataly Olivarez MD, PCP is Tino Calix MD

## 2021-11-11 NOTE — CONSULT LETTER
[Dear  ___] : Dear  [unfilled], [Consult Letter:] : I had the pleasure of evaluating your patient, [unfilled]. [Please see my note below.] : Please see my note below. [Consult Closing:] : Thank you very much for allowing me to participate in the care of this patient.  If you have any questions, please do not hesitate to contact me. [Sincerely,] : Sincerely, [FreeTextEntry3] : \par Bassem Coley M.D., FACS, ECNU\par Director [facility]athyroid [facility]\par [facility]\par Certified in Thyroid/Parathyroid/Neck Ultrasound, ECNU/ AIUM\par \par , Department of Otolaryngology\par [employer] [facility] [employer] [facility]\par

## 2022-04-18 ENCOUNTER — RX RENEWAL (OUTPATIENT)
Age: 37
End: 2022-04-18

## 2022-04-18 RX ORDER — LEVOTHYROXINE SODIUM 0.17 MG/1
175 TABLET ORAL DAILY
Qty: 30 | Refills: 0 | Status: ACTIVE | COMMUNITY
Start: 2021-06-18 | End: 1900-01-01

## 2022-05-17 ENCOUNTER — APPOINTMENT (OUTPATIENT)
Dept: OTOLARYNGOLOGY | Facility: CLINIC | Age: 37
End: 2022-05-17

## 2022-05-24 DIAGNOSIS — E89.0 POSTPROCEDURAL HYPOTHYROIDISM: ICD-10-CM

## 2022-06-29 NOTE — HISTORY OF PRESENT ILLNESS
Problem: BIRTH - VAGINAL/ SECTION  Goal: Fetal and maternal status remain reassuring during the birth process  Description: INTERVENTIONS:  - Monitor vital signs  - Monitor fetal heart rate  - Monitor uterine activity  - Monitor labor progression (vaginal delivery)  - DVT prophylaxis (C/S delivery)  - Surgical antibiotic prophylaxis (C/S delivery)  2022 by Marta Vergara RN  Outcome: Progressing  2022 by Marta Vergara RN  Outcome: Progressing     Problem: PAIN - ADULT  Goal: Verbalizes/displays adequate comfort level or patient's stated pain goal  Description: INTERVENTIONS:  - Encourage pt to monitor pain and request assistance  - Assess pain using appropriate pain scale  - Administer analgesics based on type and severity of pain and evaluate response  - Implement non-pharmacological measures as appropriate and evaluate response  - Consider cultural and social influences on pain and pain management  - Manage/alleviate anxiety  - Utilize distraction and/or relaxation techniques  - Monitor for opioid side effects  - Notify MD/LIP if interventions unsuccessful or patient reports new pain  - Anticipate increased pain with activity and pre-medicate as appropriate  2022 by Marta Vergara RN  Outcome: Progressing  2022 by Marta Vergara RN  Outcome: Progressing     Problem: ANXIETY  Goal: Will report anxiety at manageable levels  Description: INTERVENTIONS:  - Administer medication as ordered  - Teach and rehearse alternative coping skills  - Provide emotional support with 1:1 interaction with staff  2022 by Marta Vergara RN  Outcome: Progressing  2022 by Marta Vergara RN  Outcome: Progressing     Problem: Patient/Family Goals  Goal: Patient/Family Long Term Goal  Description: Patient's Long Term Goal: Safe, vaginal delivery     Interventions:  -   - See additional Care Plan goals for specific interventions  2022 1929 by Flaco Xavier RN  Outcome: Progressing  6/28/2022 1929 by Flaco Xavier RN  Outcome: Progressing  Goal: Patient/Family Short Term Goal  Description: Patient's Short Term Goal: Effective pain management     Interventions:   - IV meds, epidural when in labor   - See additional Care Plan goals for specific interventions  6/28/2022 1929 by Flaco Xavier RN  Outcome: Progressing  6/28/2022 1929 by Flaco Xavier RN  Outcome: Progressing [FreeTextEntry1] : body aching all over, all the time\par hair falling out since last visit\par not feeling hot, but never feels cold (when others around her may be cold)\par sleeping ok.  has been very busy\par periods regular\par some palpitations.  trying to avoid coffee but she wants it.\par treated for sinus infection since last visit\par saw ophtho in October.   has elevated eye pressure.  She still feels pressure in her eyes.\par got flu vaccine\par \par Meds:\par methimazole 10mg daily -- has been at this dose for past 4 weeks.\par propranolol bid\par selenium, prenatal vitamin\par

## 2022-10-04 ENCOUNTER — APPOINTMENT (OUTPATIENT)
Dept: OTOLARYNGOLOGY | Facility: CLINIC | Age: 37
End: 2022-10-04

## 2023-01-11 NOTE — OB RN PATIENT PROFILE - BP NONINVASIVE SYSTOLIC (MM HG)
[No Acute Distress] : no acute distress [Normal Sclera/Conjunctiva] : normal sclera/conjunctiva [Normal Outer Ear/Nose] : the outer ears and nose were normal in appearance [No JVD] : no jugular venous distention [No Respiratory Distress] : no respiratory distress  [Normal Affect] : the affect was normal [Normal Insight/Judgement] : insight and judgment were intact 121

## 2023-07-21 NOTE — CHART NOTE - NSCHARTNOTEFT_GEN_A_CORE
Patient has used dilaudid in the past for post operative pain control, will send in Rx for this and also I have updated the work note for her S/O  Kelsie Griffith NP on 7/21/2023 at 3:14 PM     R4 OB Chart Note    Called by RN for pt w/ worsening pelvic cramping.  Earlier today pt had complained of LOF and spec exam showed small pooling of green fluid, +nitrazine, neg ferning, +amnisure.  Currently pt states she had continued worsening LOF throughout the afternoon/evening and now has painful uterine cramping and low back pain.  She denies fevers/chills, sob/cp, ha, n/v/d, dysuria.    ICU Vital Signs Last 24 Hrs  T(C): 36.8 (2020 20:51), Max: 36.9 (2020 18:03)  T(F): 98.24 (2020 20:51), Max: 98.5 (2020 18:03)  HR: 75 (2020 20:51) (70 - 80)  BP: 125/56 (2020 20:51) (103/44 - 137/53)  RR: 17 (2020 18:03) (16 - 18)  SpO2: 100% (2020 18:03) (97% - 100%)    EXAM  nad, a&ox3  abd soft, ++fundal tenderness  sse moderate pooling of light green clear fluid, os appears closed  sve +cervical tenderness, 0/0/-3 soft, mid position    A/P: 34y  at 18w4d a/w hyperthyroidism and fetal tachycardia, now grossly ruptured on exam now with fundal tenderness.  - stat cbc, cmp, lactate, coags, fibrinogen, t&S  - npo, LR@125  - will bring down to L&D  - start Amp/Gent for chorio  - will proceed with IOL    d/w Dr. Casper and Dr. Ines Plunkett MD PGY4

## 2023-07-24 ENCOUNTER — APPOINTMENT (OUTPATIENT)
Dept: ANTEPARTUM | Facility: CLINIC | Age: 38
End: 2023-07-24

## 2023-08-17 ENCOUNTER — RX RENEWAL (OUTPATIENT)
Age: 38
End: 2023-08-17

## 2023-08-17 RX ORDER — LEVOTHYROXINE SODIUM 0.17 MG/1
175 TABLET ORAL DAILY
Qty: 90 | Refills: 0 | Status: ACTIVE | COMMUNITY
Start: 2022-05-24 | End: 1900-01-01

## 2024-02-05 ENCOUNTER — TRANSCRIPTION ENCOUNTER (OUTPATIENT)
Age: 39
End: 2024-02-05